# Patient Record
Sex: FEMALE | Race: WHITE | NOT HISPANIC OR LATINO | Employment: UNEMPLOYED | URBAN - METROPOLITAN AREA
[De-identification: names, ages, dates, MRNs, and addresses within clinical notes are randomized per-mention and may not be internally consistent; named-entity substitution may affect disease eponyms.]

---

## 2023-05-03 PROBLEM — N87.9 CERVICAL DYSPLASIA: Status: ACTIVE | Noted: 2023-05-03

## 2023-05-03 PROBLEM — M79.7 FIBROMYALGIA: Status: ACTIVE | Noted: 2023-05-03

## 2023-05-03 PROBLEM — E06.3 HASHIMOTO'S DISEASE: Status: ACTIVE | Noted: 2023-05-03

## 2023-05-03 PROBLEM — G89.29 CHRONIC BACK PAIN: Status: ACTIVE | Noted: 2023-05-03

## 2023-05-03 PROBLEM — M19.90 ARTHRITIS: Status: ACTIVE | Noted: 2023-05-03

## 2023-05-03 PROBLEM — J44.9 COPD (CHRONIC OBSTRUCTIVE PULMONARY DISEASE) (MULTI): Status: ACTIVE | Noted: 2023-05-03

## 2023-05-03 PROBLEM — L23.7 POISON IVY: Status: RESOLVED | Noted: 2023-05-03 | Resolved: 2023-05-03

## 2023-05-03 PROBLEM — A64 STD (FEMALE): Status: ACTIVE | Noted: 2023-05-03

## 2023-05-03 PROBLEM — K58.9 IBS (IRRITABLE BOWEL SYNDROME): Status: ACTIVE | Noted: 2023-05-03

## 2023-05-03 PROBLEM — M54.9 CHRONIC BACK PAIN: Status: ACTIVE | Noted: 2023-05-03

## 2023-05-03 PROBLEM — E03.9 HYPOTHYROIDISM: Status: ACTIVE | Noted: 2023-05-03

## 2023-05-03 PROBLEM — E78.2 MIXED HYPERLIPIDEMIA: Status: ACTIVE | Noted: 2023-05-03

## 2023-05-03 RX ORDER — ATORVASTATIN CALCIUM 20 MG/1
20 TABLET, FILM COATED ORAL DAILY
COMMUNITY
End: 2024-01-25 | Stop reason: SDUPTHER

## 2023-08-04 ENCOUNTER — OFFICE VISIT (OUTPATIENT)
Dept: PRIMARY CARE | Facility: CLINIC | Age: 58
End: 2023-08-04
Payer: MEDICAID

## 2023-08-04 VITALS
HEART RATE: 65 BPM | BODY MASS INDEX: 29.83 KG/M2 | RESPIRATION RATE: 16 BRPM | SYSTOLIC BLOOD PRESSURE: 134 MMHG | WEIGHT: 158 LBS | OXYGEN SATURATION: 97 % | HEIGHT: 61 IN | DIASTOLIC BLOOD PRESSURE: 82 MMHG

## 2023-08-04 DIAGNOSIS — M47.9 SPONDYLOSIS: ICD-10-CM

## 2023-08-04 DIAGNOSIS — R15.9 FULL INCONTINENCE OF FECES: Primary | ICD-10-CM

## 2023-08-04 DIAGNOSIS — M48.061 FORAMINAL STENOSIS OF LUMBAR REGION: ICD-10-CM

## 2023-08-04 DIAGNOSIS — Z12.11 ENCOUNTER FOR SCREENING FOR MALIGNANT NEOPLASM OF COLON: ICD-10-CM

## 2023-08-04 DIAGNOSIS — F25.0 SCHIZOAFFECTIVE DISORDER, BIPOLAR TYPE (MULTI): ICD-10-CM

## 2023-08-04 DIAGNOSIS — F15.11: ICD-10-CM

## 2023-08-04 DIAGNOSIS — R20.0 LEFT LEG NUMBNESS: ICD-10-CM

## 2023-08-04 PROBLEM — J44.9 COPD (CHRONIC OBSTRUCTIVE PULMONARY DISEASE) (MULTI): Status: RESOLVED | Noted: 2023-05-03 | Resolved: 2023-08-04

## 2023-08-04 PROCEDURE — 99214 OFFICE O/P EST MOD 30 MIN: CPT | Performed by: STUDENT IN AN ORGANIZED HEALTH CARE EDUCATION/TRAINING PROGRAM

## 2023-08-04 RX ORDER — SERTRALINE HYDROCHLORIDE 100 MG/1
TABLET, FILM COATED ORAL
COMMUNITY
Start: 2023-07-20 | End: 2023-10-26

## 2023-08-04 RX ORDER — SERTRALINE HYDROCHLORIDE 50 MG/1
TABLET, FILM COATED ORAL
COMMUNITY
Start: 2023-07-20 | End: 2023-10-26

## 2023-08-04 ASSESSMENT — ENCOUNTER SYMPTOMS
FEVER: 0
NERVOUS/ANXIOUS: 1
BACK PAIN: 1
WEAKNESS: 0
NUMBNESS: 1
SHORTNESS OF BREATH: 0
HALLUCINATIONS: 0
LOSS OF SENSATION IN FEET: 0
SEIZURES: 0
FACIAL ASYMMETRY: 0
FATIGUE: 0
ACTIVITY CHANGE: 1
ABDOMINAL PAIN: 0
DIZZINESS: 0
CONFUSION: 0
DEPRESSION: 0
ARTHRALGIAS: 1
LIGHT-HEADEDNESS: 0
OCCASIONAL FEELINGS OF UNSTEADINESS: 0
TREMORS: 0
HEADACHES: 0
ROS GI COMMENTS: FECAL INCONTINENCE
SPEECH DIFFICULTY: 0

## 2023-08-04 ASSESSMENT — PATIENT HEALTH QUESTIONNAIRE - PHQ9
1. LITTLE INTEREST OR PLEASURE IN DOING THINGS: NOT AT ALL
SUM OF ALL RESPONSES TO PHQ9 QUESTIONS 1 AND 2: 0
2. FEELING DOWN, DEPRESSED OR HOPELESS: NOT AT ALL

## 2023-08-04 ASSESSMENT — PAIN SCALES - GENERAL: PAINLEVEL: 6

## 2023-08-04 NOTE — PROGRESS NOTES
"Patient Name:  Mary Delgadillo  MRN:  [id number]  :  1965    Subjective   Patient ID: Mayr Delgadillo is a 57 y.o. female who presents for Leg Cramps (LEFT leg feels tingly LEFT great toe numb RIGHT lower leg cramps and hurts NECK hurts to look side to side ).    HPI   58 yo female presents with multiple concerns     1 week ago full incontinence of feces  Woke up from dead sleep with sheets soiled  Since having difficulty assessing when to go with almost having other accidents  Now also having new numbness in the L lower leg  Denies saddle anesthesia, radiation of symptom, hx of malignancy, hx of chronic steroid usage  Denies trauma to spine  Also concerns of R leg cramping intermittently     Review of Systems   Constitutional:  Positive for activity change. Negative for fatigue and fever.   Respiratory:  Negative for shortness of breath.    Cardiovascular:  Negative for chest pain.   Gastrointestinal:  Negative for abdominal pain.        Fecal incontinence   Musculoskeletal:  Positive for arthralgias and back pain. Negative for gait problem.   Allergic/Immunologic: Negative for immunocompromised state.   Neurological:  Positive for numbness. Negative for dizziness, tremors, seizures, syncope, facial asymmetry, speech difficulty, weakness, light-headedness and headaches.   Psychiatric/Behavioral:  Negative for confusion and hallucinations. The patient is nervous/anxious.      Objective   /82   Pulse 65   Resp 16   Ht 1.549 m (5' 1\")   Wt 71.7 kg (158 lb)   SpO2 97%   BMI 29.85 kg/m²     Physical Exam  Constitutional:       Appearance: Normal appearance.   HENT:      Head: Normocephalic and atraumatic.   Cardiovascular:      Rate and Rhythm: Normal rate.   Pulmonary:      Effort: Pulmonary effort is normal.   Neurological:      Mental Status: She is alert.      Comments: Patient endorses decreased sensation from L hip down to L toes  No weakness noted on exam, symmetric strength and ROM  Negative " straight leg    Psychiatric:         Mood and Affect: Mood normal.         Speech: Speech is tangential.         Behavior: Behavior normal.     Assessment/Plan   1. Full incontinence of feces  MR lumbar spine w and wo IV contrast    XR lumbar spine 2-3 views    Follow Up In [facility] - PCP - Established    CANCELED: MR lumbar spine w and wo IV contrast      2. Left leg numbness  MR lumbar spine w and wo IV contrast    XR lumbar spine 2-3 views    EMG & nerve conduction    Follow Up In [facility] - PCP - Established    CANCELED: MR lumbar spine w and wo IV contrast      3. Encounter for screening for malignant neoplasm of colon  Colonoscopy      4. Schizoaffective disorder, bipolar type (CMS/HCC)        5. History of methamphetamine abuse (CMS/HCC)          Discussed with patient combination of new fecal incontinence/back pain/L leg sensation deficit I would recommend ED to rule out cauda equina or other acute emergency- patient declines she states she understands the risks  Working on obtaining stat MRI, insurance providing some push back trying to work around  Able to get xray today-ordered  EMG will schedule  Colonoscopy down the line also needed    With her mental health- stopped invega, and is no longer seeing a counselor, reports her psychiatrist is leaving [place] as well unsure who will take place  Recommending close follow up and continuing zoloft     Continues to feel strong in her sobriety

## 2023-08-04 NOTE — PATIENT INSTRUCTIONS
Will work on MRI trying to get stat  Xray today  If symptoms worsen- Need to report to the ED   EMG ordered for nerve symptoms

## 2023-08-07 ENCOUNTER — TELEPHONE (OUTPATIENT)
Dept: PRIMARY CARE | Facility: CLINIC | Age: 58
End: 2023-08-07
Payer: MEDICAID

## 2023-08-07 NOTE — TELEPHONE ENCOUNTER
----- Message from Verónica Bradshaw DO sent at 8/7/2023  6:59 AM EDT -----  Patient has multilevel spondylosis with narrowing, most pronounced at the lower spine L4/L5. Placing order for spinal evaluation-referral. As discussed with her before if symptoms persist, ED.

## 2023-08-07 NOTE — TELEPHONE ENCOUNTER
I think she is looking for a name of the MD you would like her to see since Dr Kay is unavailable.Please advise

## 2023-08-07 NOTE — TELEPHONE ENCOUNTER
Mary called back / given results    Who did you want her to see ?  (She is okay with going outside of [facility].)

## 2023-09-05 DIAGNOSIS — R20.0 LEFT LEG NUMBNESS: ICD-10-CM

## 2023-09-06 ENCOUNTER — TELEPHONE (OUTPATIENT)
Dept: PRIMARY CARE | Facility: CLINIC | Age: 58
End: 2023-09-06
Payer: MEDICAID

## 2023-09-21 LAB — C REACTIVE PROTEIN (MG/L) IN SER/PLAS: <0.1 MG/DL

## 2023-09-22 LAB
DEAMIDATED GLIADIN PEPTIDE IGA: <1 U/ML (ref 0–14)
DEAMIDATED GLIADIN PEPTIDE IGG: <1 U/ML (ref 0–14)
HEPATITIS C VIRUS AB PRESENCE IN SERUM: NONREACTIVE
TISSUE TRANSGLUTAMINASE IGG: <1 U/ML (ref 0–14)
TISSUE TRANSGLUTAMINASE, IGA: <1 U/ML (ref 0–14)

## 2023-10-02 ENCOUNTER — APPOINTMENT (OUTPATIENT)
Dept: PHYSICAL THERAPY | Facility: HOSPITAL | Age: 58
End: 2023-10-02
Payer: MEDICAID

## 2023-10-05 ENCOUNTER — APPOINTMENT (OUTPATIENT)
Dept: PHYSICAL THERAPY | Facility: HOSPITAL | Age: 58
End: 2023-10-05
Payer: MEDICAID

## 2023-10-05 ENCOUNTER — DOCUMENTATION (OUTPATIENT)
Dept: PHYSICAL THERAPY | Facility: HOSPITAL | Age: 58
End: 2023-10-05
Payer: MEDICAID

## 2023-10-05 NOTE — PROGRESS NOTES
Physical Therapy                 Therapy Communication Note    Patient Name: Mary Delgadillo  MRN: [id number]  Today's Date: 10/5/2023     Discipline: Physical Therapy    Missed Visit Reason:  no reason    Missed Time: Cancel    Comment:

## 2023-10-09 ENCOUNTER — TREATMENT (OUTPATIENT)
Dept: PHYSICAL THERAPY | Facility: HOSPITAL | Age: 58
End: 2023-10-09
Payer: MEDICAID

## 2023-10-09 DIAGNOSIS — R29.898 WEAKNESS OF BACK: ICD-10-CM

## 2023-10-09 DIAGNOSIS — M54.50 LOW BACK PAIN, UNSPECIFIED: Primary | ICD-10-CM

## 2023-10-09 DIAGNOSIS — M54.9 DORSALGIA, UNSPECIFIED: ICD-10-CM

## 2023-10-09 PROCEDURE — 97113 AQUATIC THERAPY/EXERCISES: CPT | Mod: GP,CQ

## 2023-10-09 PROCEDURE — 97110 THERAPEUTIC EXERCISES: CPT | Mod: 59,GP | Performed by: PHYSICAL THERAPIST

## 2023-10-09 ASSESSMENT — PAIN DESCRIPTION - DESCRIPTORS: DESCRIPTORS: ACHING

## 2023-10-09 ASSESSMENT — PAIN - FUNCTIONAL ASSESSMENT
PAIN_FUNCTIONAL_ASSESSMENT: 0-10
PAIN_FUNCTIONAL_ASSESSMENT: 0-10

## 2023-10-09 ASSESSMENT — PAIN SCALES - GENERAL
PAINLEVEL_OUTOF10: 7
PAINLEVEL_OUTOF10: 7

## 2023-10-09 ASSESSMENT — ENCOUNTER SYMPTOMS
LOSS OF SENSATION IN FEET: 1
DEPRESSION: 0
OCCASIONAL FEELINGS OF UNSTEADINESS: 0

## 2023-10-09 NOTE — PROGRESS NOTES
Physical Therapy    Physical Therapy Treatment    Patient Name: Mary Delgadillo  MRN: [id number]  Today's Date: 10/9/2023  Visit 5         Assessment:       Plan: Continue PT        Current Problem  1. Low back pain, unspecified        2. Dorsalgia, unspecified  Follow Up In Physical Therapy      3. Weakness of back            Subjective 7/10 LBP and Rt LE lower leg  Pulling out a tree root at home she fell back, no injury reported. 1 fall , not due to loss of balance  Next week travels to [place]  Visit 5     Precautions  Lumbar spondylolisthesis  No prior ortho surgery to spine  Smokes, hx of pneumonia , resources provided         Pain  Pain Assessment: 0-10  Pain Score: 7  Pain Type: Chronic pain  Pain Location: Back    Objective        Posture , increased lumbar lordosis, hx spondylolisthesis     Extremity/Trunk Assessment    Lumbopelvic:   Abdominal 3+/5.   Hip:   Extension: 3/5 on right and 3/5 on left.   Flexion: 5/5 on right and 5/5 on left.   Abduction: 4+/5 on right and 4+/5 on left.     Trunk ROM  Forwards reach to mid shin  Pain with side bend left , ROM WFL    Cues to engage abdominal with activities and exercises        Outcome Measures:        Treatments:  Nustep 11 min L1   Pelvic tilt 10x 2  Standing with back to wall pelvic tilt 10 x 2  Rows red 10 x 2  Lat pulls red 10 x2   Shuttle DLP 5B 10 x 2  Shuttle SLP 3 B 10 x 2  Prone lying ( at home 1-2 min)   Hamstrings WNL 90 each  Education re core abdominals        OP EDUCATION: core abdominal engagement       Goals:  Encounter Problems       Encounter Problems (Active)       PT Problem       PT Goal 1       Start:  10/09/23    Expected End:  11/20/23       Pt will demonstrate independence and compliance with HEP and self management         PT Goal 2       Start:  10/09/23    Expected End:  11/20/23       Pt to improve sleep 50% with positioning and with therapy with less pain           PT Goal 3       Start:  10/09/23    Expected End:  11/20/23        Pt will demonstrate >=50% reduction of pain           PT Goal 4       Start:  10/09/23    Expected End:  11/20/23       Pt will improve strength by >=1/3 MMT to improve ease with functional mobility  Oswestry to improve 5 points or more to increase in function

## 2023-10-09 NOTE — PROGRESS NOTES
"Physical Therapy    Physical Therapy Treatment    Patient Name: Mary Delgadillo  MRN: [id number]  Today's Date: 10/9/2023  Time Calculation  Start Time: 1350  Stop Time: 1435  Time Calculation (min): 45 minVisit #5      Assessment:  PT Assessment  Evaluation/Treatment Tolerance: Patient tolerated treatment well  Assessment Comment: pain decreased to 5/10 in LB after treatment, good ppostural awareness with all exs    Plan:  OP PT Plan  PT Plan:  (progress reps and add wand exs next visit if johanny)    Current Problem  1. Low back pain, unspecified        2. Dorsalgia, unspecified  Follow Up In Physical Therapy      3. Weakness of back            Subjective  pt reports has been doing a lot of work around house and outdoors           Pain  Pain Assessment: 0-10  Pain Score: 7  Pain Type: Chronic pain  Pain Location: Back  Pain Orientation: Lower  Pain Descriptors: Aching  Pain Frequency: Intermittent    Objective  added Hip EXT and deep water Biking        Treatments:   Aquatic Exercise  Aquatic Exercise Performed: Yes  Aquatic Exercise Activity 1: Amb Forw/Retro/Lat X 2 laps EA  Aquatic Exercise Activity 2: Marching X 2 laps  Aquatic Exercise Activity 3: TR/HR X 15 EA  Aquatic Exercise Activity 4: Squats X 15  Aquatic Exercise Activity 5: SLR X 3 - 15 EA  Aquatic Exercise Activity 6: Step ups X !5 EA  Aquatic Exercise Activity 7: HS and Gastroc stretches- 3X 15\"  Aquatic Exercise Activity 8: Deep water Biking and distraction 5' and 5'          Goals:       PT Goal 1         Start:  10/09/23    Expected End:  11/20/23        Pt will demonstrate independence and compliance with HEP and self management           PT Goal 2         Start:  10/09/23    Expected End:  11/20/23        Pt to improve sleep 50% with positioning and with therapy with less pain              PT Goal 3         Start:  10/09/23    Expected End:  11/20/23        Pt will demonstrate >=50% reduction of pain              PT Goal 4         Start:  10/09/23   "  Expected End:  11/20/23        Pt will improve strength by >=1/3 MMT to improve ease with functional mobility  Oswestry to improve 5 points or more to increase in function

## 2023-10-12 ENCOUNTER — TREATMENT (OUTPATIENT)
Dept: PHYSICAL THERAPY | Facility: HOSPITAL | Age: 58
End: 2023-10-12
Payer: MEDICAID

## 2023-10-12 DIAGNOSIS — M54.9 DORSALGIA, UNSPECIFIED: ICD-10-CM

## 2023-10-12 DIAGNOSIS — M54.50 LOW BACK PAIN, UNSPECIFIED: Primary | ICD-10-CM

## 2023-10-12 DIAGNOSIS — R29.898 WEAKNESS OF BACK: ICD-10-CM

## 2023-10-12 DIAGNOSIS — R29.898 WEAKNESS OF BACK: Primary | ICD-10-CM

## 2023-10-12 PROCEDURE — 97110 THERAPEUTIC EXERCISES: CPT | Mod: 59,GP | Performed by: PHYSICAL THERAPIST

## 2023-10-12 PROCEDURE — 97113 AQUATIC THERAPY/EXERCISES: CPT | Mod: GP,CQ

## 2023-10-12 ASSESSMENT — PAIN SCALES - GENERAL
PAINLEVEL_OUTOF10: 8
PAINLEVEL_OUTOF10: 8

## 2023-10-12 ASSESSMENT — PAIN DESCRIPTION - DESCRIPTORS
DESCRIPTORS: ACHING
DESCRIPTORS: ACHING

## 2023-10-12 ASSESSMENT — PAIN - FUNCTIONAL ASSESSMENT
PAIN_FUNCTIONAL_ASSESSMENT: 0-10
PAIN_FUNCTIONAL_ASSESSMENT: 0-10

## 2023-10-12 NOTE — PROGRESS NOTES
"Physical Therapy    Physical Therapy Treatment    Patient Name: Mary Delgadillo  MRN: [id number]  Today's Date: 10/12/2023  Time Calculation  Start Time: 1350  Stop Time: 1435  Time Calculation (min): 45 min  VISIT 6      Assessment:  PT Assessment  Evaluation/Treatment Tolerance: Patient tolerated treatment well  Assessment Comment: pain decreased 5/10 after deep water distraction, good johanny of new exs and reps, improved posture with all exs    Plan:  OP PT Plan  PT Plan:  (add BDB to amb if able next visit)    Current Problem  1. Weakness of back        2. Dorsalgia, unspecified  Follow Up In Physical Therapy          Subjective  pain 8/10 after land therapy today           Pain  Pain Assessment: 0-10  Pain Score: 8  Pain Type: Chronic pain  Pain Location: Back  Pain Orientation: Lower  Pain Descriptors: Aching  Pain Frequency: Intermittent    Objective added tray exs and increased reps added Wand exs X3 with BDB        Treatments:     Aquatic Exercise  Aquatic Exercise Performed: Yes  Aquatic Exercise Activity 1: Amb Forw/Retro/Lat X 2 laps EA  Aquatic Exercise Activity 2: Marching X 2 laps  Aquatic Exercise Activity 3: TR/HR X 15 EA  Aquatic Exercise Activity 4: Squats X 15  Aquatic Exercise Activity 5: SLR X 3 - 15 EA  Aquatic Exercise Activity 6: Step ups X !5 EA  Aquatic Exercise Activity 7: HS and Gastroc stretches- 3X 15\"  Aquatic Exercise Activity 8: Deep water Biking and distraction 5' and 5'  Aquatic Exercise Activity 9: tray Exs -PUSH/PULL, UP/DOWN-X 20 EA  T!  Aquatic Exercise Activity 10: Wand Exs X 3-Shoulder FLEX/EXT, ABD/ADD and Horizontal ABD/ADD X 15 EA- BDB      Goals:  Active       PT Problem       PT Goal 1 (Met)       Start:  10/09/23    Expected End:  11/20/23    Resolved:  10/12/23    Pt will demonstrate independence and compliance with HEP and self management         PT Goal 2 (Progressing)       Start:  10/09/23    Expected End:  11/20/23       Pt to improve sleep 50% with positioning and " with therapy with less pain           PT Goal 3 (Not Progressing)       Start:  10/09/23    Expected End:  11/20/23       Pt will demonstrate >=50% reduction of pain           PT Goal 4 (Progressing)       Start:  10/09/23    Expected End:  11/20/23       Pt will improve strength by >=1/3 MMT to improve ease with functional mobility  Oswestry to improve 5 points or more to increase in function

## 2023-10-12 NOTE — PROGRESS NOTES
Physical Therapy    Physical Therapy Treatment    Patient Name: Mary Delgadillo  MRN: [id number]  Today's Date: 10/12/2023  Visit 6  Time Calculation  Start Time: 0100  Stop Time: 0140  Time Calculation (min): 40 min      Assessment:        Plan:         Current Problem  1. Low back pain, unspecified        2. Dorsalgia, unspecified  Follow Up In Physical Therapy      3. Weakness of back            Subjective 8/10 LBP and Rt LE lower leg  Last week when Pulling out a tree root at home she fell back, no injury reported. 1 fall , not due to loss of balance. May have increased her pain with outdoor work.   Next week travels to [place]  Visit 6     Precautions  Lumbar spondylolisthesis  No prior ortho surgery to spine  Smokes, hx of pneumonia , resources provided         Pain  Pain Assessment: 0-10  Pain Score: 8  Pain Location: Back  Pain Orientation: Lower, Left  Pain Descriptors: Aching    Objective        Posture , increased lumbar lordosis, hx spondylolisthesis     Extremity/Trunk Assessment    Lumbopelvic:   Abdominal 4-/5.   Hip:   Extension: 4/5 on right and 4/5 on left.   Flexion: 5/5 on right and 5/5 on left.   Abduction: 4/5 on right and 4/5 on left.     Trunk ROM  Forwards reach to mid shin  Pain with side bend left , ROM WFL    Cues to engage abdominal with activities and exercises      Treatments:  Nustep 7 min L2 ( stopped short of 10 min due to back discomfort )  Pelvic tilt 10x 2  Standing with back to wall pelvic tilt 10 x 2  Rows red 10 x 2  Lat pulls red 10 x2   Shuttle DLP 5B 10 x 2  Shuttle SLP 3 B 10 x 2  Prone lying ( at home 1-2 min)  Single knee to chest 10 sec x 3 each  Clam shell 10 x 2 each  Lower trunk rotate 10 sec x 3   Hamstrings WNL 90 each  Education re core abdominals        OP EDUCATION: core abdominal engagement. Advise to pace self with outdoor projects to reduce the lifting and twisting and bending of yard work       Goals:  Active       PT Problem       PT Goal 1 (Met)        Start:  10/09/23    Expected End:  11/20/23    Resolved:  10/12/23    Pt will demonstrate independence and compliance with HEP and self management         PT Goal 2 (Progressing)       Start:  10/09/23    Expected End:  11/20/23       Pt to improve sleep 50% with positioning and with therapy with less pain           PT Goal 3 (Not Progressing)       Start:  10/09/23    Expected End:  11/20/23       Pt will demonstrate >=50% reduction of pain           PT Goal 4       Start:  10/09/23    Expected End:  11/20/23       Pt will improve strength by >=1/3 MMT to improve ease with functional mobility  Oswestry to improve 5 points or more to increase in function

## 2023-10-26 ENCOUNTER — OFFICE VISIT (OUTPATIENT)
Dept: PRIMARY CARE | Facility: CLINIC | Age: 58
End: 2023-10-26
Payer: MEDICAID

## 2023-10-26 VITALS
SYSTOLIC BLOOD PRESSURE: 132 MMHG | HEIGHT: 62 IN | DIASTOLIC BLOOD PRESSURE: 68 MMHG | BODY MASS INDEX: 29.26 KG/M2 | WEIGHT: 159 LBS | HEART RATE: 67 BPM

## 2023-10-26 DIAGNOSIS — Z12.31 ENCOUNTER FOR SCREENING MAMMOGRAM FOR BREAST CANCER: ICD-10-CM

## 2023-10-26 DIAGNOSIS — M79.7 FIBROMYALGIA: Primary | ICD-10-CM

## 2023-10-26 DIAGNOSIS — E78.2 MIXED HYPERLIPIDEMIA: ICD-10-CM

## 2023-10-26 DIAGNOSIS — Z12.11 ENCOUNTER FOR SCREENING COLONOSCOPY FOR NON-HIGH-RISK PATIENT: ICD-10-CM

## 2023-10-26 DIAGNOSIS — F17.200 CURRENT SMOKER: ICD-10-CM

## 2023-10-26 DIAGNOSIS — E06.3 HASHIMOTO'S DISEASE: ICD-10-CM

## 2023-10-26 PROBLEM — F25.0 SCHIZOAFFECTIVE DISORDER, BIPOLAR TYPE (MULTI): Chronic | Status: RESOLVED | Noted: 2023-08-04 | Resolved: 2023-10-26

## 2023-10-26 PROCEDURE — 99406 BEHAV CHNG SMOKING 3-10 MIN: CPT | Performed by: STUDENT IN AN ORGANIZED HEALTH CARE EDUCATION/TRAINING PROGRAM

## 2023-10-26 PROCEDURE — 99214 OFFICE O/P EST MOD 30 MIN: CPT | Performed by: STUDENT IN AN ORGANIZED HEALTH CARE EDUCATION/TRAINING PROGRAM

## 2023-10-26 RX ORDER — SERTRALINE HYDROCHLORIDE 50 MG/1
TABLET, FILM COATED ORAL
Qty: 90 TABLET | Refills: 1 | Status: SHIPPED | OUTPATIENT
Start: 2023-10-26

## 2023-10-26 RX ORDER — SERTRALINE HYDROCHLORIDE 100 MG/1
TABLET, FILM COATED ORAL
Qty: 90 TABLET | Refills: 1 | Status: SHIPPED | OUTPATIENT
Start: 2023-10-26 | End: 2024-03-18 | Stop reason: SDUPTHER

## 2023-10-26 RX ORDER — POLYETHYLENE GLYCOL-3350 AND ELECTROLYTES 236; 6.74; 5.86; 2.97; 22.74 G/274.31G; G/274.31G; G/274.31G; G/274.31G; G/274.31G
POWDER, FOR SOLUTION ORAL
COMMUNITY
Start: 2023-09-21 | End: 2023-12-20 | Stop reason: ALTCHOICE

## 2023-10-26 ASSESSMENT — ENCOUNTER SYMPTOMS
OCCASIONAL FEELINGS OF UNSTEADINESS: 0
NERVOUS/ANXIOUS: 0
FACIAL ASYMMETRY: 0
DEPRESSION: 0
DYSPHORIC MOOD: 0
APPETITE CHANGE: 0
SHORTNESS OF BREATH: 0
ARTHRALGIAS: 1
LOSS OF SENSATION IN FEET: 0
HEADACHES: 0
DECREASED CONCENTRATION: 0
FEVER: 0
CONFUSION: 0
PALPITATIONS: 0
COUGH: 0

## 2023-10-26 ASSESSMENT — COLUMBIA-SUICIDE SEVERITY RATING SCALE - C-SSRS
2. HAVE YOU ACTUALLY HAD ANY THOUGHTS OF KILLING YOURSELF?: NO
1. IN THE PAST MONTH, HAVE YOU WISHED YOU WERE DEAD OR WISHED YOU COULD GO TO SLEEP AND NOT WAKE UP?: NO
6. HAVE YOU EVER DONE ANYTHING, STARTED TO DO ANYTHING, OR PREPARED TO DO ANYTHING TO END YOUR LIFE?: NO

## 2023-10-26 NOTE — PROGRESS NOTES
"Patient Name:  Mary Delgadillo  MRN:  [id number]  :  1965    Subjective   Patient ID: Mary Delgadillo is a 58 y.o. female who presents for Follow-up (Here to discuss Zoloft, was seeing Sal.  Said she was on Zoloft for fibromyalgia for last 30 years, not mental health.).    HPI     57 yo female presents to discuss zoloft     [facility] was giving her zoloft for fibromyalgia then psychiatry took over, her psychiatrist has left and she wants to continue med  Zoloft is really helpful     Diagnosis of bipolar was while in [place] she does not have any josé miguel or depression symptoms and has not since a \"breakdown\" at the beginning of that event due to a lot of outside stressors    Has successfully quit for 11 years in the past     Review of Systems   Constitutional:  Negative for appetite change and fever.   Respiratory:  Negative for cough and shortness of breath.    Cardiovascular:  Negative for chest pain, palpitations and leg swelling.   Musculoskeletal:  Positive for arthralgias.   Allergic/Immunologic: Negative for immunocompromised state.   Neurological:  Negative for facial asymmetry and headaches.   Psychiatric/Behavioral:  Negative for behavioral problems, confusion, decreased concentration, dysphoric mood and suicidal ideas. The patient is not nervous/anxious.      Objective   /68 (BP Location: Right arm, Patient Position: Sitting)   Pulse 67   Ht 1.562 m (5' 1.5\")   Wt 72.1 kg (159 lb)   BMI 29.56 kg/m²     Physical Exam  Constitutional:       Appearance: Normal appearance.   HENT:      Head: Normocephalic and atraumatic.   Pulmonary:      Effort: Pulmonary effort is normal.   Skin:     Coloration: Skin is not jaundiced or pale.   Neurological:      Mental Status: She is alert and oriented to person, place, and time.   Psychiatric:         Mood and Affect: Mood normal.         Behavior: Behavior normal.         Thought Content: Thought content normal.         Judgment: Judgment " normal.     Assessment/Plan   Problem List Items Addressed This Visit             ICD-10-CM    Fibromyalgia - Primary (Chronic) M79.7     Zoloft controls symptoms, continue at present dose 150mg          Relevant Medications    sertraline (Zoloft) 50 mg tablet    sertraline (Zoloft) 100 mg tablet    Other Relevant Orders    Follow Up In [facility] - PCP - Established    Hashimoto's disease (Chronic) E06.3     Currently not requiring medication  Annual labs          Relevant Orders    Tsh With Reflex To Free T4 If Abnormal    Mixed hyperlipidemia E78.2    Relevant Orders    Lipid Panel    Comprehensive metabolic panel    Current smoker F17.200     Counseled on long term negative health impacts on health if tobacco use continued.  Reviewed options for cessation aid including patches, lozenges, Wellbutrin, Chantix.  Discussed motivational factors to improve chances for success.  Time Spent: 4  Orders: none, will call once she looks into the patches if she would like script             Other Visit Diagnoses         Codes    Encounter for screening colonoscopy for non-high-risk patient     Z12.11    Relevant Medications    GaviLyte-G 236-22.74-6.74 -5.86 gram solution    Encounter for screening mammogram for breast cancer     Z12.31    Relevant Orders    BI mammo bilateral screening tomosynthesis

## 2023-10-26 NOTE — ASSESSMENT & PLAN NOTE
Counseled on long term negative health impacts on health if tobacco use continued.  Reviewed options for cessation aid including patches, lozenges, Wellbutrin, Chantix.  Discussed motivational factors to improve chances for success.  Time Spent: 4  Orders: none, will call once she looks into the patches if she would like script

## 2023-10-31 ENCOUNTER — ANESTHESIA EVENT (OUTPATIENT)
Dept: GASTROENTEROLOGY | Facility: HOSPITAL | Age: 58
End: 2023-10-31
Payer: MEDICAID

## 2023-10-31 ENCOUNTER — ANESTHESIA (OUTPATIENT)
Dept: GASTROENTEROLOGY | Facility: HOSPITAL | Age: 58
End: 2023-10-31
Payer: MEDICAID

## 2023-10-31 ENCOUNTER — HOSPITAL ENCOUNTER (OUTPATIENT)
Dept: GASTROENTEROLOGY | Facility: HOSPITAL | Age: 58
Setting detail: OUTPATIENT SURGERY
Discharge: HOME | End: 2023-10-31
Payer: MEDICAID

## 2023-10-31 VITALS
OXYGEN SATURATION: 98 % | HEIGHT: 61 IN | BODY MASS INDEX: 29.45 KG/M2 | HEART RATE: 57 BPM | SYSTOLIC BLOOD PRESSURE: 127 MMHG | WEIGHT: 156 LBS | TEMPERATURE: 97.5 F | DIASTOLIC BLOOD PRESSURE: 66 MMHG | RESPIRATION RATE: 16 BRPM

## 2023-10-31 DIAGNOSIS — K52.9 NONINFECTIVE GASTROENTERITIS AND COLITIS, UNSPECIFIED: ICD-10-CM

## 2023-10-31 PROBLEM — F20.9 SCHIZOPHRENIA (MULTI): Status: ACTIVE | Noted: 2023-10-31

## 2023-10-31 PROCEDURE — 7100000009 HC PHASE TWO TIME - INITIAL BASE CHARGE: Performed by: INTERNAL MEDICINE

## 2023-10-31 PROCEDURE — 3700000002 HC GENERAL ANESTHESIA TIME - EACH INCREMENTAL 1 MINUTE: Performed by: INTERNAL MEDICINE

## 2023-10-31 PROCEDURE — 3700000001 HC GENERAL ANESTHESIA TIME - INITIAL BASE CHARGE: Performed by: INTERNAL MEDICINE

## 2023-10-31 PROCEDURE — 45380 COLONOSCOPY AND BIOPSY: CPT | Mod: XS | Performed by: INTERNAL MEDICINE

## 2023-10-31 PROCEDURE — 88305 TISSUE EXAM BY PATHOLOGIST: CPT | Mod: TC,PORLAB,59 | Performed by: INTERNAL MEDICINE

## 2023-10-31 PROCEDURE — 2500000004 HC RX 250 GENERAL PHARMACY W/ HCPCS (ALT 636 FOR OP/ED): Performed by: INTERNAL MEDICINE

## 2023-10-31 PROCEDURE — 88305 TISSUE EXAM BY PATHOLOGIST: CPT | Performed by: STUDENT IN AN ORGANIZED HEALTH CARE EDUCATION/TRAINING PROGRAM

## 2023-10-31 PROCEDURE — 2500000005 HC RX 250 GENERAL PHARMACY W/O HCPCS: Performed by: NURSE ANESTHETIST, CERTIFIED REGISTERED

## 2023-10-31 PROCEDURE — 2500000004 HC RX 250 GENERAL PHARMACY W/ HCPCS (ALT 636 FOR OP/ED): Performed by: NURSE ANESTHETIST, CERTIFIED REGISTERED

## 2023-10-31 PROCEDURE — 88305 TISSUE EXAM BY PATHOLOGIST: CPT | Mod: TC,SUR,PORLAB | Performed by: INTERNAL MEDICINE

## 2023-10-31 PROCEDURE — 2720000007 HC OR 272 NO HCPCS: Performed by: INTERNAL MEDICINE

## 2023-10-31 PROCEDURE — A4649 SURGICAL SUPPLIES: HCPCS | Performed by: INTERNAL MEDICINE

## 2023-10-31 PROCEDURE — 45385 COLONOSCOPY W/LESION REMOVAL: CPT | Performed by: INTERNAL MEDICINE

## 2023-10-31 PROCEDURE — 7100000010 HC PHASE TWO TIME - EACH INCREMENTAL 1 MINUTE: Performed by: INTERNAL MEDICINE

## 2023-10-31 RX ORDER — SODIUM CHLORIDE 9 MG/ML
30 INJECTION, SOLUTION INTRAVENOUS CONTINUOUS
Status: DISCONTINUED | OUTPATIENT
Start: 2023-10-31 | End: 2023-11-01 | Stop reason: HOSPADM

## 2023-10-31 RX ORDER — LIDOCAINE HYDROCHLORIDE 20 MG/ML
INJECTION, SOLUTION EPIDURAL; INFILTRATION; INTRACAUDAL; PERINEURAL AS NEEDED
Status: DISCONTINUED | OUTPATIENT
Start: 2023-10-31 | End: 2023-10-31

## 2023-10-31 RX ORDER — PROPOFOL 10 MG/ML
INJECTION, EMULSION INTRAVENOUS AS NEEDED
Status: DISCONTINUED | OUTPATIENT
Start: 2023-10-31 | End: 2023-10-31

## 2023-10-31 RX ADMIN — PROPOFOL 50 MG: 10 INJECTION, EMULSION INTRAVENOUS at 11:26

## 2023-10-31 RX ADMIN — PROPOFOL 50 MG: 10 INJECTION, EMULSION INTRAVENOUS at 11:18

## 2023-10-31 RX ADMIN — PROPOFOL 30 MG: 10 INJECTION, EMULSION INTRAVENOUS at 11:10

## 2023-10-31 RX ADMIN — SODIUM CHLORIDE: 9 INJECTION, SOLUTION INTRAVENOUS at 11:01

## 2023-10-31 RX ADMIN — PROPOFOL 20 MG: 10 INJECTION, EMULSION INTRAVENOUS at 11:12

## 2023-10-31 RX ADMIN — PROPOFOL 80 MG: 10 INJECTION, EMULSION INTRAVENOUS at 11:04

## 2023-10-31 RX ADMIN — LIDOCAINE HYDROCHLORIDE 40 MG: 20 INJECTION, SOLUTION EPIDURAL; INFILTRATION; INTRACAUDAL; PERINEURAL at 11:04

## 2023-10-31 RX ADMIN — PROPOFOL 50 MG: 10 INJECTION, EMULSION INTRAVENOUS at 11:15

## 2023-10-31 RX ADMIN — PROPOFOL 50 MG: 10 INJECTION, EMULSION INTRAVENOUS at 11:21

## 2023-10-31 RX ADMIN — PROPOFOL 50 MG: 10 INJECTION, EMULSION INTRAVENOUS at 11:07

## 2023-10-31 RX ADMIN — PROPOFOL 20 MG: 10 INJECTION, EMULSION INTRAVENOUS at 11:28

## 2023-10-31 SDOH — HEALTH STABILITY: MENTAL HEALTH: CURRENT SMOKER: 1

## 2023-10-31 ASSESSMENT — PAIN SCALES - GENERAL
PAINLEVEL_OUTOF10: 4
PAIN_LEVEL: 0
PAINLEVEL_OUTOF10: 0 - NO PAIN
PAINLEVEL_OUTOF10: 4

## 2023-10-31 ASSESSMENT — PAIN - FUNCTIONAL ASSESSMENT
PAIN_FUNCTIONAL_ASSESSMENT: 0-10

## 2023-10-31 ASSESSMENT — COLUMBIA-SUICIDE SEVERITY RATING SCALE - C-SSRS
1. IN THE PAST MONTH, HAVE YOU WISHED YOU WERE DEAD OR WISHED YOU COULD GO TO SLEEP AND NOT WAKE UP?: NO
2. HAVE YOU ACTUALLY HAD ANY THOUGHTS OF KILLING YOURSELF?: NO
6. HAVE YOU EVER DONE ANYTHING, STARTED TO DO ANYTHING, OR PREPARED TO DO ANYTHING TO END YOUR LIFE?: NO

## 2023-10-31 NOTE — ANESTHESIA PREPROCEDURE EVALUATION
Patient: Mary Delgadillo    Procedure Information       Date/Time: 10/31/23 1030    Scheduled providers: Ang Del Castillo MD    Procedure: COLONOSCOPY    Location: [facility]            Relevant Problems   Anesthesia (within normal limits)      Cardiovascular   (+) Mixed hyperlipidemia      Endocrine   (+) Hypothyroidism      GI   (+) IBS (irritable bowel syndrome)      /Renal (within normal limits)      Neuro/Psych   (+) Schizophrenia (CMS/HCC)      GI/Hepatic (within normal limits)      Hematology (within normal limits)      Musculoskeletal   (+) Fibromyalgia      Infectious Disease   (+) STD (female)      Other   (+) Arthritis       Clinical information reviewed:   Tobacco  Allergies  Meds   Med Hx  Surg Hx   Fam Hx  Soc Hx        NPO Detail:  NPO/Void Status  Date of Last Liquid: 10/30/23  Time of Last Liquid: 2145  Date of Last Solid: 10/29/23  Time of Last Solid: 1900  Last Intake Type: Clear fluids         Physical Exam    Airway  Mallampati: II  TM distance: >3 FB  Neck ROM: full     Cardiovascular - normal exam  Rhythm: regular  Rate: normal     Dental - normal exam     Pulmonary - normal exam     Abdominal - normal exam             Anesthesia Plan    ASA 2     MAC     The patient is a current smoker.  Patient was previously instructed to abstain from smoking on day of procedure.  Patient smoked on day of procedure.    intravenous induction   Anesthetic plan and risks discussed with patient.

## 2023-10-31 NOTE — ANESTHESIA POSTPROCEDURE EVALUATION
Patient: Mary Delgadillo    Procedure Summary       Date: 10/31/23 Room / Location: [facility]    Anesthesia Start: 1100 Anesthesia Stop: 1138    Procedure: COLONOSCOPY Diagnosis: Noninfective gastroenteritis and colitis, unspecified    Scheduled Providers: Ang Del Castillo MD Responsible Provider: SHANA Pittman    Anesthesia Type: MAC ASA Status: 2            Anesthesia Type: MAC    Vitals Value Taken Time   BP 94/66 10/31/23 1138   Temp 36.4 °C (97.5 °F) 10/31/23 1138   Pulse 54 10/31/23 1138   Resp 16 10/31/23 1138   SpO2 97 % 10/31/23 1138       Anesthesia Post Evaluation    Patient location during evaluation: bedside  Level of consciousness: awake and alert  Pain score: 0  Pain management: adequate  Airway patency: patent  Cardiovascular status: acceptable  Respiratory status: acceptable  Hydration status: acceptable        There were no known notable events for this encounter.

## 2023-10-31 NOTE — H&P
History Of Present Illness  Mary Delgadillo is a 58 y.o. female presenting for evaluation of chronic diarrhea and associated with fever or hematochezia.  .   .     Past Medical History  She has a past medical history of COPD (chronic obstructive pulmonary disease) (CMS/[facility]), Poison ivy (05/03/2023), and Schizoaffective disorder, bipolar type (CMS/[facility]) (08/04/2023).    Surgical History  She has a past surgical history that includes Other surgical history (11/21/2022) and Bladder surgery.     Social History  She reports that she has been smoking cigarettes. She has been smoking an average of .5 packs per day. She has never used smokeless tobacco. She reports that she does not currently use drugs. She reports that she does not drink alcohol.    Family History  Family History   Problem Relation Name Age of Onset    Dementia Mother      Prostate cancer Father          Allergies  Azithromycin    Review of Systems  Constitutional: no fever, no chills, fatigue,  not feeling tired and no recent weight loss. No reports of dizziness.  SKIN: No skin rash or lesions  EYE: No pain photophobia, diplopia or blurred vision  EAR: No discharge, pain or hearing loss  NOSE: No congestion, epistaxis or obstruction  RESPIRATORY: No cough , dyspnea or  chest pain   CV: No chest pain, lower extremity swelling or palpitations  GE: No abdominal pain, nausea, vomiting, dysphagia or odynophagia. Denied constipation , diarrhea  : No dysuria or hematuria, urinary incontinence or urine frequency  NEURO: Denied weakness, confusion, dizziness, or numbness   PSYCH : Denies anxiety, hallucinations or insomnia  HEME/ LYMPH : Denied bleeding , bruising or enlarged nodes  ENDOCRINE: No change in weight, polydipsia, or abnormal bleeding  .        Physical Exam  Head and neck examinations were  unremarkable. There was no temporal wasting. No jaundice noted. No thyromegaly.  No carotid bruits.  There is no peripheral lymphadenopathy.  Lungs were clear to  "auscultation. There when no rales, rhonchi or wheezes.  Cardiac examination revealed normal heart sounds. Rhythm was sinus . There were no murmurs.  Abdomen was full and soft. There was no organomegaly. Bowel sounds were normo- active. There was no ascites. The abdomen was nontender.  Rectal examination was not done.  Extremities: No edema or joint swelling.  Neurological examination: Patient was alert, oriented in person place, and time. There when no focal neurological signs. Cranial nerves were grossly intact. No evidence of encephalopathy. There was no asterixis. The  plantar response was flexor.       Last Recorded Vitals  Blood pressure (!) 124/97, pulse 72, temperature 36.4 °C (97.5 °F), temperature source Temporal, resp. rate 16, height 1.549 m (5' 1\"), weight 70.8 kg (156 lb), SpO2 97 %.    Relevant Results        None     Assessment/Plan  Patient is here for evaluation of chronic diarrhea.  She denied presence of blood in stool or fever.  She denied abdominal pain.  We will proceed with colonoscopy as planned.    nAg Del Castillo MD  "

## 2023-11-01 NOTE — ADDENDUM NOTE
Encounter addended by: Scarlett Acosta RN on: 11/1/2023 1:47 PM   Actions taken: Contacts section saved, Flowsheet accepted

## 2023-11-11 LAB
LABORATORY COMMENT REPORT: NORMAL
PATH REPORT.FINAL DX SPEC: NORMAL
PATH REPORT.GROSS SPEC: NORMAL
PATH REPORT.RELEVANT HX SPEC: NORMAL
PATH REPORT.TOTAL CANCER: NORMAL

## 2023-11-20 ENCOUNTER — APPOINTMENT (OUTPATIENT)
Dept: PAIN MEDICINE | Facility: HOSPITAL | Age: 58
End: 2023-11-20
Payer: MEDICAID

## 2023-12-04 ENCOUNTER — HOSPITAL ENCOUNTER (OUTPATIENT)
Dept: RADIOLOGY | Facility: HOSPITAL | Age: 58
Discharge: HOME | End: 2023-12-04
Payer: MEDICAID

## 2023-12-04 DIAGNOSIS — Z12.31 ENCOUNTER FOR SCREENING MAMMOGRAM FOR BREAST CANCER: ICD-10-CM

## 2023-12-04 PROCEDURE — 77063 BREAST TOMOSYNTHESIS BI: CPT | Performed by: RADIOLOGY

## 2023-12-04 PROCEDURE — 77067 SCR MAMMO BI INCL CAD: CPT | Performed by: RADIOLOGY

## 2023-12-04 PROCEDURE — 77063 BREAST TOMOSYNTHESIS BI: CPT

## 2023-12-06 DIAGNOSIS — R92.8 ABNORMAL MAMMOGRAM: Primary | ICD-10-CM

## 2023-12-07 ENCOUNTER — TELEPHONE (OUTPATIENT)
Dept: PRIMARY CARE | Facility: CLINIC | Age: 58
End: 2023-12-07
Payer: MEDICAID

## 2023-12-07 DIAGNOSIS — R92.8 ABNORMAL MAMMOGRAM: Primary | ICD-10-CM

## 2023-12-07 NOTE — TELEPHONE ENCOUNTER
----- Message from Verónica Bradshaw DO sent at 12/6/2023  4:11 PM EST -----  Review of mammogram: radiology would like to get a better view of the L breast and recommends additional testing.   80% of the time the repeat images are normal, and approximately 10% of women get a call back for additional views. The orders have been placed, please schedule at your earliest convenience.

## 2023-12-20 ENCOUNTER — OFFICE VISIT (OUTPATIENT)
Dept: GASTROENTEROLOGY | Facility: CLINIC | Age: 58
End: 2023-12-20
Payer: MEDICAID

## 2023-12-20 ENCOUNTER — DOCUMENTATION (OUTPATIENT)
Dept: PHYSICAL THERAPY | Facility: HOSPITAL | Age: 58
End: 2023-12-20

## 2023-12-20 VITALS
BODY MASS INDEX: 30.02 KG/M2 | HEIGHT: 61 IN | OXYGEN SATURATION: 98 % | WEIGHT: 159 LBS | HEART RATE: 69 BPM | SYSTOLIC BLOOD PRESSURE: 130 MMHG | DIASTOLIC BLOOD PRESSURE: 80 MMHG

## 2023-12-20 DIAGNOSIS — R32 URINARY INCONTINENCE, UNSPECIFIED TYPE: ICD-10-CM

## 2023-12-20 DIAGNOSIS — K58.9 IRRITABLE BOWEL SYNDROME, UNSPECIFIED TYPE: Primary | ICD-10-CM

## 2023-12-20 DIAGNOSIS — R31.9 HEMATURIA, UNSPECIFIED TYPE: ICD-10-CM

## 2023-12-20 PROCEDURE — 99214 OFFICE O/P EST MOD 30 MIN: CPT | Performed by: PHYSICIAN ASSISTANT

## 2023-12-20 NOTE — ASSESSMENT & PLAN NOTE
Discussed with patient that likely ongoing loose stool and bloating is related to IBS. She states that she was diagnosed with this in the past. Suspect pelvic floor weakness as well. Colonoscopy with 5 polyps (tubular adenomas). She states that her father did have colon cancer. Recommended repeat colonoscopy in 3 years. I recommended daily fiber supplement and if no improvement imodium. Will refer to urogynecology. Stressedn completing the work up with the stool studies for calprotectin and pancreatic elastase.

## 2023-12-20 NOTE — ASSESSMENT & PLAN NOTE
Patient with long standing urinary incontinence and prior procedures with ongoing symptoms. Referral to urogynecology referral placed again, placed at initial appointment but not done.

## 2023-12-20 NOTE — PROGRESS NOTES
Physical Therapy    Discharge Summary    Name: Mary Delgadillo  MRN: [id number]  : 1965  Date: 23    Discharge Summary: PT    Discharge Information: Date of discharge 23, Date of last visit 10-12-23, Date of evaluation 23, Number of attended visits 6, Referred by LIDIA Acevedo, and Referred for chronic LBP    Discharge Status: pt last attended therapy visit 10-    Rehab Discharge Reason: Other plan of care .

## 2023-12-20 NOTE — PROGRESS NOTES
"Subjective   Patient ID: Mary Delgadillo is a 58 y.o. female who presents for Follow-up (Colon 10/31/23, having soft / loose stools, incontinence /Dad had colon cancer ).    Patient's PCP is Dr. Bradshaw    PMH: HLD, depression, fibromyalgia, and history of methamphetamine us, Hashimoto's disease    HPI  Patient was initially seen in September 2023. Patient stated that she has had some long standing GI issues for years and has had \"many colonoscopies\" in the past. Her biggest complaint was urgent diarrhea and incontinence. She states that she has 1 loose BM daily. She states that she had a anal sphincter surgery in 2004 along with bladder suspension. Details unknown, but patient states that she had an injury during child birth. She denied nocturnal diarrhea. She does report chronic urinary and stool incontinence. She has not tried anything for diarrhea.     At her initial visit, I recommended chronic diarrhea labs and stool studies. CRP and celiac disease serology negative. Stool studies not done. Hepatitis C antibody was negative. I did recommend referral to urogynecology, which was not done. Colonoscopy was done and showed 5 tubular adenomas, which were removed, and hemorrhoids. Random biopsies negative. She states that she feeling about the same. She is having 1 urgent, loose BM a day along with urinary incontinence and fecal incontinence. No N/V, abdominal pain, melena, hematochezia.  Prior GI evaluation:  See above    Review of Systems:  GI: Reports stable loose stool and fecal incontinence.  Urinary: reports urinary incontinence and hematuria. No dysuria.    Medications:  Prior to Admission medications    Medication Sig Start Date End Date Taking? Authorizing Provider   atorvastatin (Lipitor) 20 mg tablet Take 1 tablet (20 mg) by mouth once daily.   Yes Historical Provider, MD   sertraline (Zoloft) 100 mg tablet TAKE ONE TABLET BY MOUTH ONCE A DAY. TAKE WITH 50 MG TABLET 10/26/23  Yes Verónica Bradshaw, DO " "  sertraline (Zoloft) 50 mg tablet TAKE ONE TABLET BY MOUTH ONCE A DAY. TAKE WITH 100 MG TABLET 10/26/23  Yes Verónica Bradshaw, DO   GaviLyte-G 236-22.74-6.74 -5.86 gram solution mix as directed and drink over 4 hours  starting at 4pm 9/21/23 12/20/23  Historical Provider, MD       Allergies:  Azithromycin    Past Medical History:  She has a past medical history of COPD (chronic obstructive pulmonary disease) (CMS/[facility]), Poison ivy (05/03/2023), and Schizoaffective disorder, bipolar type (CMS/[facility]) (08/04/2023).    Past Surgical History:  She has a past surgical history that includes Other surgical history (11/21/2022) and Bladder surgery.    Social History:  She reports that she has been smoking cigarettes. She has been smoking an average of .5 packs per day. She has never used smokeless tobacco. She reports that she does not currently use drugs. She reports that she does not drink alcohol.    Objective   Physical exam:  Constitutional: Well developed, well nourished 58 y.o. year old in no acute distress.   Skin: Warm and dry. Normal turgor. No rash, ulcer, trauma, jaundice.   Eyes: Pupils symmetric and reactive to light.  Respiratory: Clear to auscultation bilaterally. No wheezes, rhonchi, or rales heard.  Cardiovascular: Regular rate and rhythm. S1 and S2 appreciated and normal. No murmur, rub, or gallop heard.   Edema: No edema noted.  GI: Normal bowel sounds. Soft, non-distended, non-tender. No rebound or guarding present. No hepatomegaly or splenomegaly appreciated. Abdominal aorta not palpably abnormal.  Musculoskeletal: Limbs and Joints grossly normal. Full range of motion in major joint.   Neuro: Alert and oriented x 3. Cranial nerves 2-12 grossly intact.   Psych: Normal mood and affect.        Relevant Results Recent labs reviewed in the EMR.  Lab Results   Component Value Date    HGB 13.9 12/08/2022    MCV 87 12/08/2022     12/08/2022       No results found for: \"FERRITIN\", \"IRON\"    Lab Results " "  Component Value Date     12/08/2022    K 4.0 12/08/2022     (H) 12/08/2022    BUN 13 12/08/2022    CREATININE 0.97 12/08/2022       Lab Results   Component Value Date    BILITOT 0.4 12/08/2022     Lab Results   Component Value Date    ALT 7 12/08/2022    AST 11 12/08/2022    ALKPHOS 75 12/08/2022       Lab Results   Component Value Date    CRP <0.10 09/21/2023       No results found for: \"CALPS\"    Radiology: Reviewed imaging reviewed in the EMR.      Assessment/Plan   Problem List Items Addressed This Visit             ICD-10-CM    IBS (irritable bowel syndrome) - Primary K58.9     Discussed with patient that likely ongoing loose stool and bloating is related to IBS. She states that she was diagnosed with this in the past. Suspect pelvic floor weakness as well. Colonoscopy with 5 polyps (tubular adenomas). She states that her father did have colon cancer. Recommended repeat colonoscopy in 3 years. I recommended daily fiber supplement and if no improvement imodium. Will refer to urogynecology. Stressedn completing the work up with the stool studies for calprotectin and pancreatic elastase.          Urinary incontinence R32     Patient with long standing urinary incontinence and prior procedures with ongoing symptoms. Referral to urogynecology referral placed again, placed at initial appointment but not done.         Hematuria R31.9     No pain with urinary or signs of UTI at this time. Referral to urology placed.              Sherice Mckeon PA-C    "

## 2024-01-25 ENCOUNTER — TELEPHONE (OUTPATIENT)
Dept: PRIMARY CARE | Facility: CLINIC | Age: 59
End: 2024-01-25
Payer: MEDICAID

## 2024-01-25 DIAGNOSIS — E78.2 MIXED HYPERLIPIDEMIA: Primary | ICD-10-CM

## 2024-01-25 NOTE — TELEPHONE ENCOUNTER
Rx Refill Request Telephone Encounter    Name:  Mary OAKLEY Elie  :  [id number]  Medication Name:  Atorvastatin  20 mg          Specific Pharmacy location:  [facility]

## 2024-01-26 RX ORDER — ATORVASTATIN CALCIUM 20 MG/1
20 TABLET, FILM COATED ORAL DAILY
Qty: 90 TABLET | Refills: 3 | Status: SHIPPED | OUTPATIENT
Start: 2024-01-26

## 2024-01-30 ENCOUNTER — LAB (OUTPATIENT)
Dept: LAB | Facility: LAB | Age: 59
End: 2024-01-30
Payer: MEDICAID

## 2024-01-30 ENCOUNTER — ANCILLARY ORDERS (OUTPATIENT)
Dept: PRIMARY CARE | Facility: CLINIC | Age: 59
End: 2024-01-30

## 2024-01-30 ENCOUNTER — HOSPITAL ENCOUNTER (OUTPATIENT)
Dept: RADIOLOGY | Facility: HOSPITAL | Age: 59
Discharge: HOME | End: 2024-01-30
Payer: MEDICAID

## 2024-01-30 DIAGNOSIS — R92.8 ABNORMAL MAMMOGRAM: Primary | ICD-10-CM

## 2024-01-30 DIAGNOSIS — R92.8 ABNORMAL MAMMOGRAM: ICD-10-CM

## 2024-01-30 DIAGNOSIS — K58.9 IRRITABLE BOWEL SYNDROME, UNSPECIFIED TYPE: ICD-10-CM

## 2024-01-30 PROCEDURE — 77061 BREAST TOMOSYNTHESIS UNI: CPT | Mod: LEFT SIDE | Performed by: RADIOLOGY

## 2024-01-30 PROCEDURE — 76642 ULTRASOUND BREAST LIMITED: CPT | Mod: LT

## 2024-01-30 PROCEDURE — 76982 USE 1ST TARGET LESION: CPT | Mod: LT

## 2024-01-30 PROCEDURE — 77061 BREAST TOMOSYNTHESIS UNI: CPT | Mod: LT

## 2024-01-30 PROCEDURE — 77065 DX MAMMO INCL CAD UNI: CPT | Mod: LEFT SIDE | Performed by: RADIOLOGY

## 2024-01-30 PROCEDURE — 76642 ULTRASOUND BREAST LIMITED: CPT | Mod: LEFT SIDE | Performed by: RADIOLOGY

## 2024-01-30 PROCEDURE — 82653 EL-1 FECAL QUANTITATIVE: CPT

## 2024-01-30 PROCEDURE — 83993 ASSAY FOR CALPROTECTIN FECAL: CPT

## 2024-01-31 ENCOUNTER — TELEPHONE (OUTPATIENT)
Dept: PRIMARY CARE | Facility: CLINIC | Age: 59
End: 2024-01-31
Payer: MEDICAID

## 2024-01-31 DIAGNOSIS — Z71.1 CONCERN ABOUT SKIN CANCER WITHOUT DIAGNOSIS: Primary | ICD-10-CM

## 2024-01-31 NOTE — TELEPHONE ENCOUNTER
Her imaging was most consistent with a cyst which is a benign entity. The radiologist cannot say 100% without a biopsy they can only say what the structure looks like. We follow these for every 6 months to make sure no change that would suggest something that is not benign, but at this time to the best of our ability it is a cyst.     Derm referral placed.

## 2024-01-31 NOTE — TELEPHONE ENCOUNTER
"I spoke with patient she is concerned with the terminology on the results saying \"probably benign\" and would like further clarification. Also asking for a ref for derm for all the masses and bumps on her body.  "

## 2024-01-31 NOTE — TELEPHONE ENCOUNTER
----- Message from Verónica Bradshaw DO sent at 1/31/2024  9:26 AM EST -----  Masses consistent with benign etiology/complex cysts, plan for 6 month diagnostic laura + US. ordered

## 2024-02-01 ENCOUNTER — OFFICE VISIT (OUTPATIENT)
Dept: PAIN MEDICINE | Facility: HOSPITAL | Age: 59
End: 2024-02-01
Payer: MEDICAID

## 2024-02-01 VITALS
SYSTOLIC BLOOD PRESSURE: 133 MMHG | HEART RATE: 78 BPM | BODY MASS INDEX: 29.37 KG/M2 | DIASTOLIC BLOOD PRESSURE: 86 MMHG | RESPIRATION RATE: 16 BRPM | HEIGHT: 62 IN | WEIGHT: 159.6 LBS

## 2024-02-01 DIAGNOSIS — M54.16 LUMBAR NEURITIS: Primary | ICD-10-CM

## 2024-02-01 PROCEDURE — 99214 OFFICE O/P EST MOD 30 MIN: CPT | Performed by: PHYSICAL MEDICINE & REHABILITATION

## 2024-02-01 PROCEDURE — 99204 OFFICE O/P NEW MOD 45 MIN: CPT | Performed by: PHYSICAL MEDICINE & REHABILITATION

## 2024-02-01 ASSESSMENT — ENCOUNTER SYMPTOMS
DEPRESSION: 0
LOSS OF SENSATION IN FEET: 0
OCCASIONAL FEELINGS OF UNSTEADINESS: 0

## 2024-02-01 ASSESSMENT — PATIENT HEALTH QUESTIONNAIRE - PHQ9
1. LITTLE INTEREST OR PLEASURE IN DOING THINGS: NOT AT ALL
2. FEELING DOWN, DEPRESSED OR HOPELESS: NOT AT ALL
SUM OF ALL RESPONSES TO PHQ9 QUESTIONS 1 AND 2: 0

## 2024-02-01 NOTE — PROGRESS NOTES
"Subjective   Patient ID: Mary Delgadillo is a 58 y.o. female with PMH of IBS, schizophrenia (with hallucinations; resolved with invaiga), hx of methamphetamine, fibromyalgia (dx 30 years ago), hashimotos who presents for Back Pain.  HPI  Per patient original injury was in , was helping son move and herniated 4  discs; MRI a the time showed multiple herniated discs; she reports that she was unable to ambulate for 6 months. She physically recovered using methamphetamines; she stopped in  after her   from drug overdose. Of note, she was incarcerated for 13 months in [place] (for assault/trespassing); which she feels exacerbated her back pain (release in 2022). Pain has been progressively getting worse since. Does not feel like her fibromyalgia pain.     Patient presents to office for new radicular low back pain. radiates down right leg to ankle, states that lateral left leg has tingling and numbness but not pain. Pain intereferes with sleep and ADLs, pain is increased with prolonged sitting, and laying down. Better with walking. Reports increased sensitivity in dorsal aspect of left foot.  She reports bowel accidents (attributed to IBS).    Patient was seen by Dr. Acevedo on 23; \"if any surgical intervention is needed after failure of nonoperative management. If she undergoes a trial of physical therapy and pain management without improvement in her symptoms we have recommended she return to clinic for discussion of potential surgical procedure which would be a L4-5 MIS TLIF. \"    Pain Score: 8/10 at rest    Injections/Procedures: denies injection, PT in past without relief (last completed in October >6 sessions) continues to do exercises.    Neuromodulators/Other Medications: zoloft for fibromyalgia    Other:                OARRS:  No data recorded  I believe that it is clinically appropriate for Mary Delgadillo to be prescribed this medication    Is the patient prescribed a combination of a " benzodiazepine and opioid?  No    Last Urine Drug Screen / ordered today: No  No results found for this or any previous visit (from the past 8760 hour(s)).  N/A    Review of Systems     Current Outpatient Medications   Medication Instructions    atorvastatin (LIPITOR) 20 mg, oral, Daily    sertraline (Zoloft) 100 mg tablet TAKE ONE TABLET BY MOUTH ONCE A DAY. TAKE WITH 50 MG TABLET    sertraline (Zoloft) 50 mg tablet TAKE ONE TABLET BY MOUTH ONCE A DAY. TAKE WITH 100 MG TABLET        Past Medical History:   Diagnosis Date    COPD (chronic obstructive pulmonary disease) (CMS/[facility])     Poison ivy 05/03/2023    Schizoaffective disorder, bipolar type (CMS/[facility]) 08/04/2023        Past Surgical History:   Procedure Laterality Date    BLADDER SURGERY      OTHER SURGICAL HISTORY  11/21/2022    Nose surgery        Family History   Problem Relation Name Age of Onset    Dementia Mother      Prostate cancer Father          Allergies   Allergen Reactions    Azithromycin Unknown        Objective     Vitals:    02/01/24 1307   BP: 133/86   Pulse: 78   Resp: 16        Physical Exam    GENERAL EXAM  Vital Signs: Vital signs to include heart rate, respiration rate, blood pressure, and temperature were reviewed.  General Appearance:  Awake, alert, healthy appearing, well developed, No acute distress.  Head: Normocephalic without evidence of head injury.  Neck: The appearance of the neck was normal without swelling with a midline trachea.  Eyes: The eyelids and eyebrows exhibited no abnormalities.  Pupils were not pin-point.  Sclera was without icterus.  Lungs: Respiration rhythm and depth was normal.  Respiratory movements were normal without labored breathing.  Cardiovascular: No peripheral edema was present.    Neurological: Patient was oriented to time, place, and person.  Speech was normal.  Balance, gait, and stance were unremarkable.    Psychiatric: Appearance was normal with appropriate dress.  Mood was euthymic and affect was  normal.  Skin: Affected regions were without ecchymosis or skin lesions.    Physical exam as above except:  Pain to palpation of cervical and lumbar paraspinals bilaterally  Significant pain with forward flexion limited to 30-40 degrees   Positive seated slump on the right  RACHAEL and FADIR neg b/l     Assessment/Plan   Diagnoses and all orders for this visit:  Lumbar neuritis  -     Epidural Steroid Injection; Future    58 y.o. female with PMH of IBS, schizophrenia (with hallucinations; resolved with invaiga), hx of methamphetamine, fibromyalgia (dx 30 years ago), hashimotos who presents for Back Pain.  Suspected L4 and L5 b/l radiculitis   Fibromyalgia      - Patient has completed Physical therapy with limited benefit  - MRI  of lumbar spine from 08/04/23 interpreted  as:  Evidence of anterolisthesis of L4 on L5 with evidence of central canal stenosis   - Given failure of greater of 6 weeks of conservative therapy and pain that is affecting her ability to ambulate patient will be scheduled for L4-L5 interlaminar epidural steroid injection.   - would likely have better benefit with duloxetine than zoloft (prescribed by PCP) for pain but will defer that to her PCP.  - Could consider the addition of other medications though patient rightfully anxious about anything else given her drug abuse history.          This note was generated with the aid of dictation software, there may be typos despite my attempts at proofreading.     I saw and evaluated the patient. I personally obtained the key and critical portions of the history and physical exam or was physically present for key and critical portions performed by the resident/fellow. I reviewed the resident/fellow's documentation and discussed the patient with the resident/fellow. I agree with the resident/fellow's medical decision making as documented in the note.    Tejas Echols MD

## 2024-02-02 LAB — CALPROTECTIN STL-MCNT: <5 UG/G

## 2024-02-03 LAB — PANCREATIC ELASTASE, FECAL: >800

## 2024-02-16 ENCOUNTER — OFFICE VISIT (OUTPATIENT)
Dept: UROLOGY | Facility: CLINIC | Age: 59
End: 2024-02-16
Payer: MEDICAID

## 2024-02-16 VITALS — DIASTOLIC BLOOD PRESSURE: 82 MMHG | SYSTOLIC BLOOD PRESSURE: 133 MMHG

## 2024-02-16 DIAGNOSIS — R10.2 PELVIC PAIN: Primary | ICD-10-CM

## 2024-02-16 DIAGNOSIS — R32 URINARY INCONTINENCE, UNSPECIFIED TYPE: ICD-10-CM

## 2024-02-16 DIAGNOSIS — R31.9 HEMATURIA, UNSPECIFIED TYPE: ICD-10-CM

## 2024-02-16 PROCEDURE — 51798 US URINE CAPACITY MEASURE: CPT | Performed by: STUDENT IN AN ORGANIZED HEALTH CARE EDUCATION/TRAINING PROGRAM

## 2024-02-16 PROCEDURE — 99205 OFFICE O/P NEW HI 60 MIN: CPT | Performed by: STUDENT IN AN ORGANIZED HEALTH CARE EDUCATION/TRAINING PROGRAM

## 2024-02-16 RX ORDER — DIAZEPAM 5 MG/1
TABLET ORAL
Qty: 20 TABLET | Refills: 2 | Status: SHIPPED | OUTPATIENT
Start: 2024-02-16

## 2024-02-16 NOTE — PROGRESS NOTES
Referred by:  Dr. Bradshaw    PCP  Verónica Bradshaw DO         CHIEF COMPLAINT:           HISTORY OF PRESENT ILLNESS:  This is a  58 y.o. y.o. who presents on 2/16/24 referred by Dr. Bradshaw who believes she has something in pelvic floor. Patient states that onset around 10/23 she had fecal urgency and this only occurred once. She has IBS symptoms. Patient has urinary frequency. She had a sphincterotomy twice in the past for weakened muscles that caused her to have fecal incontinence. She was advised to get a coloscopy bag but never did. She has one child. Not currently taking any fiber. She reports pain upon wiping with bowel movements.  Has h/o prior sling.     Patient is not currently sexually active as her  passed away a few years ago. She never experienced dyspareunia.          Past Medical History  She has a past medical history of COPD (chronic obstructive pulmonary disease) (CMS/[facility]), Poison ivy (05/03/2023), and Schizoaffective disorder, bipolar type (CMS/[facility]) (08/04/2023).    Surgical History  She has a past surgical history that includes Other surgical history (11/21/2022) and Bladder surgery.     Social History  She reports that she has been smoking cigarettes. She has been smoking an average of .5 packs per day. She has never used smokeless tobacco. She reports that she does not currently use drugs. She reports that she does not drink alcohol.    Family History  Family History   Problem Relation Name Age of Onset    Dementia Mother      Prostate cancer Father          Allergies  Azithromycin        A comprehensive 10+ review of systems was negative except for: see hpi                    PHYSICAL EXAMINATION:  BP Readings from Last 3 Encounters:   02/01/24 133/86   12/20/23 130/80   10/31/23 127/66      Wt Readings from Last 3 Encounters:   02/01/24 72.4 kg (159 lb 9.6 oz)   12/20/23 72.1 kg (159 lb)   10/31/23 70.8 kg (156 lb)      BMI: Estimated body mass index is 29.19 kg/m² as calculated from  "the following:    Height as of 2/1/24: 1.575 m (5' 2\").    Weight as of 2/1/24: 72.4 kg (159 lb 9.6 oz).  BSA: Estimated body surface area is 1.78 meters squared as calculated from the following:    Height as of 2/1/24: 1.575 m (5' 2\").    Weight as of 2/1/24: 72.4 kg (159 lb 9.6 oz).  HEENT: Normocephalic, atraumatic, PER EOMI, nonicteric, trachea normal, thyroid normal, oropharynx normal.  CARDIAC: regular rate & rhythm, S1 & S2 normal.  No heaves, thrills, gallops or murmurs.  LUNGS: Clear to auscultation, no spinal or CV tenderness.  EXTREMITIES: No evidence of cyanosis, clubbing or edema.      Pelvic:  Genitourinary:  normal external genitalia, Bartholin's glands negative, [place]'s glands negative  Urethra   mesh erosion noted  Vaginal mucosa  normal  Cervix surgically absent normal  Uterus surgically absent normal size, nontender  Adnexae  negative nontender, no masses  Atrophy negative    CST negative  Pelvic floor muscle contraction  4/5    POP-Q (in supine position):        Aa -3     Ba -3     C -8              gh 3     pb 0.5     tvl 8              Ap -3     Bp -3     D     Rectal: no hemorrhoids, fissures or masses    PVR (by Ultrasound):  16        IMPRESSION AND PLAN:  Mary Delgadillo is a 58 y.o. who presents with mesh erosion from prior sling, pelvic floor muscle hypertonicity, fecal incontinence    CPP:  Hypertonic pelvic floor  Start PT  Given valium to use  Discussed pelvic floor botox    FI:  Start metamucil and PT   Discussed SNM and anal bulking    Mesh erosion:   Not presently bothersome, will monitor       Follow up in 3 months      All questions and concerns were answered and addressed.  The patient expressed understanding and agrees with the plan.     2/16/2024     By signing my name below, IBo Scribe   attest that this documentation has been prepared under the direction and in the presence of Dr. Sarkis Boggs.     "

## 2024-02-16 NOTE — LETTER
February 19, 2024     Sherice Mckeon PA-C  [address]  [place] [facility] [address]    Patient: Mary Delgadillo   YOB: 1965   Date of Visit: 2/16/2024       Dear Dr. Sherice Mckeon PA-C:    Thank you for referring Mary Delgadillo to me for evaluation. Below are my notes for this consultation.  If you have questions, please do not hesitate to call me. I look forward to following your patient along with you.       Sincerely,     Sarkis Boggs MD      CC: No Recipients  ______________________________________________________________________________________    Referred by:  Dr. Bradshaw    PCP  Verónica Bradshaw DO         CHIEF COMPLAINT:           HISTORY OF PRESENT ILLNESS:  This is a  58 y.o. y.o. who presents on 2/16/24 referred by Dr. Bradshaw who believes she has something in pelvic floor. Patient states that onset around 10/23 she had fecal urgency and this only occurred once. She has IBS symptoms. Patient has urinary frequency. She had a sphincterotomy twice in the past for weakened muscles that caused her to have fecal incontinence. She was advised to get a coloscopy bag but never did. She has one child. Not currently taking any fiber. She reports pain upon wiping with bowel movements.  Has h/o prior sling.     Patient is not currently sexually active as her  passed away a few years ago. She never experienced dyspareunia.          Past Medical History  She has a past medical history of COPD (chronic obstructive pulmonary disease) (CMS/[facility]), Poison ivy (05/03/2023), and Schizoaffective disorder, bipolar type (CMS/[facility]) (08/04/2023).    Surgical History  She has a past surgical history that includes Other surgical history (11/21/2022) and Bladder surgery.     Social History  She reports that she has been smoking cigarettes. She has been smoking an average of .5 packs per day. She has never used smokeless tobacco. She reports that she does not currently  "use drugs. She reports that she does not drink alcohol.    Family History  Family History   Problem Relation Name Age of Onset   • Dementia Mother     • Prostate cancer Father          Allergies  Azithromycin        A comprehensive 10+ review of systems was negative except for: see hpi                    PHYSICAL EXAMINATION:  BP Readings from Last 3 Encounters:   02/01/24 133/86   12/20/23 130/80   10/31/23 127/66      Wt Readings from Last 3 Encounters:   02/01/24 72.4 kg (159 lb 9.6 oz)   12/20/23 72.1 kg (159 lb)   10/31/23 70.8 kg (156 lb)      BMI: Estimated body mass index is 29.19 kg/m² as calculated from the following:    Height as of 2/1/24: 1.575 m (5' 2\").    Weight as of 2/1/24: 72.4 kg (159 lb 9.6 oz).  BSA: Estimated body surface area is 1.78 meters squared as calculated from the following:    Height as of 2/1/24: 1.575 m (5' 2\").    Weight as of 2/1/24: 72.4 kg (159 lb 9.6 oz).  HEENT: Normocephalic, atraumatic, PER EOMI, nonicteric, trachea normal, thyroid normal, oropharynx normal.  CARDIAC: regular rate & rhythm, S1 & S2 normal.  No heaves, thrills, gallops or murmurs.  LUNGS: Clear to auscultation, no spinal or CV tenderness.  EXTREMITIES: No evidence of cyanosis, clubbing or edema.      Pelvic:  Genitourinary:  normal external genitalia, Bartholin's glands negative, [place]'s glands negative  Urethra   mesh erosion noted  Vaginal mucosa  normal  Cervix surgically absent normal  Uterus surgically absent normal size, nontender  Adnexae  negative nontender, no masses  Atrophy negative    CST negative  Pelvic floor muscle contraction  4/5    POP-Q (in supine position):        Aa -3     Ba -3     C -8              gh 3     pb 0.5     tvl 8              Ap -3     Bp -3     D     Rectal: no hemorrhoids, fissures or masses    PVR (by Ultrasound):  16        IMPRESSION AND PLAN:  Mary Delgadillo is a 58 y.o. who presents with mesh erosion from prior sling, pelvic floor muscle hypertonicity, fecal " incontinence    CPP:  Hypertonic pelvic floor  Start PT  Given valium to use  Discussed pelvic floor botox    FI:  Start metamucil and PT   Discussed SNM and anal bulking    Mesh erosion:   Not presently bothersome, will monitor       Follow up in 3 months      All questions and concerns were answered and addressed.  The patient expressed understanding and agrees with the plan.     2/16/2024     By signing my name below, IBo Scribe   attest that this documentation has been prepared under the direction and in the presence of Dr. Sarkis Boggs.

## 2024-03-01 ENCOUNTER — TELEPHONE (OUTPATIENT)
Dept: PRIMARY CARE | Facility: CLINIC | Age: 59
End: 2024-03-01
Payer: MEDICAID

## 2024-03-01 NOTE — TELEPHONE ENCOUNTER
Med Refill   sertraline (Zoloft) 50 mg tablet [[id number]]     [id number] - [place], [place] - [address] [address]  Phone: [phone]  Fax: [phone]

## 2024-03-08 ENCOUNTER — HOSPITAL ENCOUNTER (OUTPATIENT)
Dept: GASTROENTEROLOGY | Facility: HOSPITAL | Age: 59
Discharge: HOME | End: 2024-03-08
Payer: MEDICAID

## 2024-03-08 ENCOUNTER — HOSPITAL ENCOUNTER (OUTPATIENT)
Dept: RADIOLOGY | Facility: HOSPITAL | Age: 59
Discharge: HOME | End: 2024-03-08
Payer: MEDICAID

## 2024-03-08 VITALS
RESPIRATION RATE: 18 BRPM | TEMPERATURE: 98.4 F | HEART RATE: 60 BPM | SYSTOLIC BLOOD PRESSURE: 163 MMHG | WEIGHT: 158 LBS | DIASTOLIC BLOOD PRESSURE: 92 MMHG | HEIGHT: 62 IN | OXYGEN SATURATION: 99 % | BODY MASS INDEX: 29.08 KG/M2

## 2024-03-08 DIAGNOSIS — M54.16 LUMBAR NEURITIS: ICD-10-CM

## 2024-03-08 DIAGNOSIS — R52 PAIN: ICD-10-CM

## 2024-03-08 PROCEDURE — 2500000005 HC RX 250 GENERAL PHARMACY W/O HCPCS: Performed by: PHYSICAL MEDICINE & REHABILITATION

## 2024-03-08 PROCEDURE — 62323 NJX INTERLAMINAR LMBR/SAC: CPT | Performed by: PHYSICAL MEDICINE & REHABILITATION

## 2024-03-08 PROCEDURE — 2500000004 HC RX 250 GENERAL PHARMACY W/ HCPCS (ALT 636 FOR OP/ED): Performed by: PHYSICAL MEDICINE & REHABILITATION

## 2024-03-08 PROCEDURE — 2550000001 HC RX 255 CONTRASTS: Performed by: PHYSICAL MEDICINE & REHABILITATION

## 2024-03-08 RX ORDER — LIDOCAINE HYDROCHLORIDE 5 MG/ML
INJECTION, SOLUTION INFILTRATION; INTRAVENOUS AS NEEDED
Status: COMPLETED | OUTPATIENT
Start: 2024-03-08 | End: 2024-03-08

## 2024-03-08 RX ORDER — METHYLPREDNISOLONE ACETATE 40 MG/ML
INJECTION, SUSPENSION INTRA-ARTICULAR; INTRALESIONAL; INTRAMUSCULAR; SOFT TISSUE AS NEEDED
Status: COMPLETED | OUTPATIENT
Start: 2024-03-08 | End: 2024-03-08

## 2024-03-08 RX ADMIN — LIDOCAINE HYDROCHLORIDE 15 ML: 5 INJECTION, SOLUTION INFILTRATION at 09:24

## 2024-03-08 RX ADMIN — IOHEXOL 5 ML: 350 INJECTION, SOLUTION INTRAVENOUS at 09:27

## 2024-03-08 RX ADMIN — METHYLPREDNISOLONE ACETATE 40 MG: 40 INJECTION, SUSPENSION INTRA-ARTICULAR; INTRALESIONAL; INTRAMUSCULAR; SOFT TISSUE at 09:27

## 2024-03-08 ASSESSMENT — COLUMBIA-SUICIDE SEVERITY RATING SCALE - C-SSRS
1. IN THE PAST MONTH, HAVE YOU WISHED YOU WERE DEAD OR WISHED YOU COULD GO TO SLEEP AND NOT WAKE UP?: NO
6. HAVE YOU EVER DONE ANYTHING, STARTED TO DO ANYTHING, OR PREPARED TO DO ANYTHING TO END YOUR LIFE?: NO
2. HAVE YOU ACTUALLY HAD ANY THOUGHTS OF KILLING YOURSELF?: NO

## 2024-03-08 ASSESSMENT — PAIN - FUNCTIONAL ASSESSMENT: PAIN_FUNCTIONAL_ASSESSMENT: 0-10

## 2024-03-08 ASSESSMENT — PAIN SCALES - GENERAL
PAINLEVEL_OUTOF10: 7
PAINLEVEL_OUTOF10: 10 - WORST POSSIBLE PAIN

## 2024-03-08 NOTE — PROCEDURES
General    Date/Time: 3/8/2024 9:29 AM    Performed by: Tejas Echols MD  Authorized by: Tejas Echols MD    Consent:     Consent obtained:  Written    Consent given by:  Patient    Risks, benefits, and alternatives were discussed: yes      Risks discussed:  Bleeding, infection, pain and nerve damage    Alternatives discussed:  No treatment, delayed treatment and alternative treatment  Universal protocol:     Procedure explained and questions answered to patient or proxy's satisfaction: yes      Relevant documents present and verified: yes      Test results available: yes      Imaging studies available: yes      Required blood products, implants, devices, and special equipment available: yes      Site/side marked: yes      Immediately prior to procedure, a time out was called: yes      Patient identity confirmed:  Verbally with patient, hospital-assigned identification number and arm band  Procedure specific details:      Lumbar interlaminar REUBEN     After informed consent was obtained, the patient was brought to the operating room and placed in the prone position.  The back area was prepped and draped in usual sterile fashion.  Using fluoroscopic guidance, the skin and subcutaneous tissue overlying needle trajectory to the below interlaminar epidural space were anesthetized with a total of 5 mL of 0.5% lidocaine in the below paraspinous approach.  An 18-gauge Tuohy needle was then advanced under fluoroscopic guidance with the below paraspinous approach into the below  interlaminar epidural space. Entry of the epidural space was confirmed using loss of resistance technique with a glass syringe and 2 mL of air.  Injection of Iohexol contrast revealed appropriate spread without vascular uptake. Subsequently, the below medications were injected.  The needle was removed.  The patient tolerated the procedure well.  The patient was then transferred to recovery room in stable condition. The patient will participate in  physical therapy, update us on his/her response, follow up with us on outpatient basis as needed.    Level: [L4-5]  Medications [4 mL of 0.5% lidocaine and 40 mg methylprednisolone]

## 2024-03-08 NOTE — H&P
Patient ID: Patient with lumbar neuritis who presents for the scheduled procedure.  No changes since last visit      Patient denies any recent antibiotic use or infections, denies any blood thinner use, and denies contrast or local anesthetic allergies           GENERAL EXAM  Vital Signs: Vital signs to include heart rate, respiration rate, blood pressure, and temperature were reviewed.  General Appearance:  Awake, alert, healthy appearing, well developed, No acute distress.  Head: Normocephalic without evidence of head injury.  Neck: The appearance of the neck was normal without swelling with a midline trachea.  Eyes: The eyelids and eyebrows exhibited no abnormalities.  Pupils were not pin-point.  Sclera was without icterus.  Lungs: Respiration rhythm and depth was normal.  Respiratory movements were normal without labored breathing.  Cardiovascular: No peripheral edema was present.    Neurological: Patient was oriented to time, place, and person.  Speech was normal.  Balance, gait, and stance were unremarkable.    Psychiatric: Appearance was normal with appropriate dress.  Mood was euthymic and affect was normal.  Skin: Affected regions were without ecchymosis or skin lesions.        Physical exam as above except:        Assessment/Plan    Patient with lumbar neuritis here for L4-5 interlaminar REUBEN

## 2024-03-18 ENCOUNTER — TELEPHONE (OUTPATIENT)
Dept: PRIMARY CARE | Facility: CLINIC | Age: 59
End: 2024-03-18
Payer: MEDICAID

## 2024-03-18 DIAGNOSIS — M79.7 FIBROMYALGIA: ICD-10-CM

## 2024-03-18 RX ORDER — SERTRALINE HYDROCHLORIDE 100 MG/1
TABLET, FILM COATED ORAL
Qty: 90 TABLET | Refills: 1 | Status: SHIPPED | OUTPATIENT
Start: 2024-03-18

## 2024-03-25 ENCOUNTER — EVALUATION (OUTPATIENT)
Dept: PHYSICAL THERAPY | Facility: HOSPITAL | Age: 59
End: 2024-03-25
Payer: MEDICAID

## 2024-03-25 DIAGNOSIS — N39.46 MIXED STRESS AND URGE URINARY INCONTINENCE: ICD-10-CM

## 2024-03-25 DIAGNOSIS — R10.2 PELVIC PAIN: Primary | ICD-10-CM

## 2024-03-25 DIAGNOSIS — M62.81 MUSCLE WEAKNESS: ICD-10-CM

## 2024-03-25 PROCEDURE — 97163 PT EVAL HIGH COMPLEX 45 MIN: CPT | Mod: GP | Performed by: PHYSICAL THERAPIST

## 2024-03-25 PROCEDURE — 97535 SELF CARE MNGMENT TRAINING: CPT | Mod: GP | Performed by: PHYSICAL THERAPIST

## 2024-03-25 PROCEDURE — 97110 THERAPEUTIC EXERCISES: CPT | Mod: GP | Performed by: PHYSICAL THERAPIST

## 2024-03-25 ASSESSMENT — ENCOUNTER SYMPTOMS
OCCASIONAL FEELINGS OF UNSTEADINESS: 0
LOSS OF SENSATION IN FEET: 1
DEPRESSION: 0

## 2024-03-25 ASSESSMENT — PATIENT HEALTH QUESTIONNAIRE - PHQ9
SUM OF ALL RESPONSES TO PHQ9 QUESTIONS 1 AND 2: 0
2. FEELING DOWN, DEPRESSED OR HOPELESS: NOT AT ALL
1. LITTLE INTEREST OR PLEASURE IN DOING THINGS: NOT AT ALL

## 2024-03-25 ASSESSMENT — PAIN - FUNCTIONAL ASSESSMENT: PAIN_FUNCTIONAL_ASSESSMENT: 0-10

## 2024-03-25 ASSESSMENT — PAIN DESCRIPTION - DESCRIPTORS: DESCRIPTORS: DULL;ACHING

## 2024-03-25 ASSESSMENT — PAIN SCALES - GENERAL: PAINLEVEL_OUTOF10: 4

## 2024-03-25 NOTE — PROGRESS NOTES
"Physical Therapy    PELVIC FLOOR EVALUATION AND TREATMENT    Name: Mary Delgadillo  MRN: [id number]  : 1965  Today's Date: 3/25/2024     Time Calculation  Start Time: 1609  Stop Time: 1710  Time Calculation (min): 61 min    Assessment:  PT Assessment Results: Decreased strength, Decreased range of motion, Decreased endurance, Decreased coordination, Pain  Rehab Prognosis: Good   Pt presents with symptoms of mixed stress and urge urinary incontinence, urinary frequency, pelvic pain and dysfunction, pelvic floor weakness and muscle insufficiency and decreased overall endurance affecting her functional activity performance at [facility].    Recommend skilled physical therapy to address the above deficits that affect functional activities of daily living.   Skilled intervention is needed to train and provide proper technique, educate patient on progression, risks, prognosis, and provide adequate feedback for technique correction and implementation.     Discharge planned upon achievement of goals or reaching maximum benefit from skilled physical therapy services.    Plan:   Treatment/Interventions: Biofeedback, Education/ Instruction, Electrical stimulation, Manual therapy, Neuromuscular re-education, Self care/ home management, Therapeutic exercises  PT Plan: Skilled PT  PT Frequency: 1 time per week  Rehab Potential: Good  Plan of Care Agreement: Patient    Interventions: Issued and educated with handouts: the \"Knack\", Bladder irritants and Overcoming the urge    Current Problem:  1. Pelvic pain  Referral to Physical Therapy    Follow Up In Physical Therapy      2. Muscle weakness  Follow Up In Physical Therapy      3. Mixed stress and urge urinary incontinence            Subjective   General  Reason for Referral: Urinary urgency, frequency and vaginal pain  Referred By: Dr. Boggs  Past Medical History Relevant to Rehab: Hx cervical CA x2, bowel incontinence, hx sphincterotomy; IBS  Precautions  STEADI Fall Risk Score " (The score of 4 or more indicates an increased risk of falling): 3  Precautions Comment: None noted     Pain  Pain Assessment: 0-10  Pain Score: 4  Pain Type: Chronic pain  Pain Location: Vagina  Pain Orientation: Left  Pain Descriptors: Dull, Aching  Pain Frequency: Constant/continuous    Objective   PELVIC HISTORY:  Chief Complaint/Description of Symptoms: Pt reports the problems began when she had a BM in her sleep in August 2023 and she was also having back issues with disc bulges where she was having L LE numbness and R LE tingling radicular symptoms. She had lumbar steroid injections 2 weeks ago. She  HPI: Hx cervical CA x2; poor childbirth with sig episiotomy, sphincterotomy and bladder sling in 2004 and sphincterotomy done again ~8 months later, but both failed. She reports also vaginal canal pain began in Fall 2023 as well and it is worse with prolonged sitting. Dr Boggs said he could see her bladder mesh through her vaginal canal wall.  Home Environment/Social Factors/Occupation: Retired    PELVIC PAIN:  Pelvic pain since onset: worse  Location: Vaginal canal  Current pain level: 3-4/10  Worst pain level: 5/10  What makes pain better: Standing, reclining  What makes pain worse: Prolonged sitting  Pain when emptying bladder: No  Pain when urine or clothing touches the skin over the vaginal area or wiping: Yes  Pain with menses: Other: (comment) (N/A)  Pain with intercourse: No (N/A pt is a  and not sexually active)  Description: Dull aching  Duration: Intermittent  Frequency: Constant  History of back pain: Yes, just had lumbar steroid injections 2 weeks more on R     EXERCISE:  Do you do Kegels? No   Current exercise regime: No    BLADDER:  Excessive urinary urgency: always  Daytime voiding frequency : >/=15  Nighttime voiding frequency: 4+ times  Unintentional urine loss: almost daily  Leakage occurs with: cough, laugh, sneeze, urge, lifting, changing position  Leakage amount: small,  moderate  Protection: liners, Other: (comment) (Pt changes her underwear 4x/day)  Daily fluid intake: 0-12oz  Daily caffeine intake: 84oz diet Coke  Difficulty initiating urine flow: frequently  Slow/Weak urine stream: frequently  Able to void completely: No  Frequent UTI's: No  Diagnostic tests: PVR 16    BOWEL:  Unintentional stool loss: less than 1x/week  Bowel movement frequency: once a day  Frequent diarrhea: No  Constipation/straining,incomplete bowel movement: No (Pt unable to empty fully)  Taking stool softeners or fiber supplements: No  Excessive Bowel Urgency: No    *Internal exam deferred to next visit per pt's request*    EXTERNAL PALPATION:  Transverse perineum: (-) Pain/Tightness Jeanmarie    QL: (+) Pain/Tightness   Glutes: (-) Pain/Tightness   Abdominals: (-) Pain/Tightness     POSTURE: Reduced lumbar lordosis      MMT R/L:  Hip flex: 4/5 Jeanmarie  Hip abd: 4-,4/5 Jeanmarie  Hip IR: 4-/5 Jeanmarie*  Hip ER: 4-,4/5 Jeanmarie*  TA: 3+/5 with pain    ROM R/L:  Lumbar flex: Impaired  Lumbar ext: Impaired  Hip flex: WFL painful lumbar spine  Hip IR: WFL but painful lumbar spine  Hip ER: Decreased Jeanmarie with pain in hips    FLEXIBILITY R/L:  Hamstrings: ~55 deg jeanmarie  Psoas: moderate tightness    OUTCOMES MEASURE:  Female NIH-CPSI: 20 (Pain 7, Urinary 6, QOL 7)    Education:  Outpatient Education  Individual(s) Educated: Patient  Education Provided: POC, Home Exercise Program  Risk and Benefits Discussed with Patient/Caregiver/Other: yes  Patient/Caregiver Demonstrated Understanding: yes  Plan of Care Discussed and Agreed Upon: yes  Patient Response to Education: Patient/Caregiver Verbalized Understanding of Information    Careplan Goals:  Crista Galicia Problems (Active)       Problem: PT Problem       Goal: PT Goal 2          Goal: PT Goal 3          Goal: PT Goal 4            Pelvic pain PT Problems (Active)       Problem: Pelvic pain and urinary frequency       Goal: 1) Pt to report 50% reduction in incontinence episodes to indicate  increased bladder control          Goal: 2) Pt to be Indep with a core & PF stabilization and bladder control HEP with 75% teach back capability by the patient          Goal: 3) Pt to tolerate medical examinations and intercourse without increased pelvic/vaginal pain          Goal: 4) Functional Outcome NIH CPSI score improves by >50% raw score to indicate improvement in subscales          Goal: 5) Pt to decrease underwear changes to <2/day to improve pt's QOL              Liudmila Joseph, PT

## 2024-04-02 ENCOUNTER — OFFICE VISIT (OUTPATIENT)
Dept: PAIN MEDICINE | Facility: HOSPITAL | Age: 59
End: 2024-04-02
Payer: MEDICAID

## 2024-04-02 VITALS
OXYGEN SATURATION: 96 % | DIASTOLIC BLOOD PRESSURE: 81 MMHG | WEIGHT: 160 LBS | HEIGHT: 62 IN | HEART RATE: 78 BPM | RESPIRATION RATE: 16 BRPM | BODY MASS INDEX: 29.44 KG/M2 | SYSTOLIC BLOOD PRESSURE: 134 MMHG

## 2024-04-02 DIAGNOSIS — R10.2 PELVIC PAIN: Primary | ICD-10-CM

## 2024-04-02 DIAGNOSIS — M79.7 FIBROMYALGIA: Chronic | ICD-10-CM

## 2024-04-02 DIAGNOSIS — M54.16 LUMBAR NEURITIS: ICD-10-CM

## 2024-04-02 PROCEDURE — 99214 OFFICE O/P EST MOD 30 MIN: CPT | Performed by: CLINICAL NURSE SPECIALIST

## 2024-04-02 ASSESSMENT — ENCOUNTER SYMPTOMS
OCCASIONAL FEELINGS OF UNSTEADINESS: 0
LOSS OF SENSATION IN FEET: 0
DEPRESSION: 0

## 2024-04-02 ASSESSMENT — COLUMBIA-SUICIDE SEVERITY RATING SCALE - C-SSRS
2. HAVE YOU ACTUALLY HAD ANY THOUGHTS OF KILLING YOURSELF?: NO
6. HAVE YOU EVER DONE ANYTHING, STARTED TO DO ANYTHING, OR PREPARED TO DO ANYTHING TO END YOUR LIFE?: NO
1. IN THE PAST MONTH, HAVE YOU WISHED YOU WERE DEAD OR WISHED YOU COULD GO TO SLEEP AND NOT WAKE UP?: NO

## 2024-04-02 NOTE — ASSESSMENT & PLAN NOTE
58 y.o. female with PMH of IBS, schizophrenia (with hallucinations; resolved with invaiga), hx of methamphetamine, fibromyalgia, hashimotos who presents for follow-up of lumbar radicular pain.  Patient was sent for interlaminar lumbar epidural steroid injection at L4-5 targeting bilateral lumbar radiculitis.  She also has a component of fibromyalgia type pain currently affecting cervical region/trapezius muscles.  She previously completed physical therapy with limited benefit.  We discussed the benefit of restarting physical therapy exercises targeting myofascial/fibromyalgia pain.  We also discussed that continued exercise is the best treatment for fibromyalgia.  Currently she is receiving moderate relief from her recent lumbar epidural steroid injection.  She has noted a significant decrease in radicular symptoms in bilateral lower extremities after recent epidural steroid injection.  She states it is easier to go from seated to standing position and to get in and out of bed since her injection.  She also has noted less cramping and spasms in her lower legs.  She is experiencing myofascial pain and tenderness to the paracervical region as well as persistent pelvic pain and pressure.  She is following with urology for pelvic pain and is continuing pelvic floor therapy.  Reviewed with patient that she may repeat her lumbar epidural steroid injection approximately every 3 months if needed.  She does feel that she continues to get relief from this injection.  She would like to repeat this injection, however, she would like to follow-up with urology to discuss further treatment recommendations for pelvic pain/pressure before repeating this injection.  Patient would like to avoid additional oral medication secondary to fear of addiction and previous history of drug abuse.  She currently is maintained on Zoloft.  She may benefit from a rotation to duloxetine which she can discuss with her PCP.  At this time we will add  NSAID compounding cream for topical pain relief which may benefit lumbar pain as well as myofascial pain related to fibromyalgia.  She may continue Tylenol and instructed to limit her total Tylenol dose to less than 3000 mg in 24 hours.  She may also benefit from the addition of lidocaine patches.  Plan reviewed with patient at today's visit.    -Advised patient that she may repeat her interlaminar epidural steroid injection at L4-5 every 3 months as needed.  She will call if she would like to reschedule this procedure.  -Added NSAID compounding cream  -She may continue supplementing with Tylenol.  She may also add lidocaine patches for topical pain relief.  -She may follow-up with our office as needed.  She may call if she would like to repeat her injection.    Disclaimer: This note was transcribed using an audio transcription device.  As such, minor errors may be present with regard to spelling, punctuation, and inadvertent word insertion.  Please disregard such errors.

## 2024-04-02 NOTE — PROGRESS NOTES
Subjective   Patient ID: Mary Delgadillo is a 58 y.o. female who presents for back pain  HPI    58-year-old female with medical history pertinent for IBS, schizophrenia with hallucinations resolved with Invaiga, striae of methamphetamine use, fibromyalgia, Hashimoto's presents for follow-up of low back pain.  Patient originally injured her back in  when she was helping her disabled son move and herniated several discs.  She reports that her MRI at that time indicated multiple herniated disks and that she was unable to ambulate for approximately 6 months.  At that point she was using methamphetamine which she stated helped her get out of bed and move.  She stopped using methamphetamine in  after her   from a drug overdose.  Incarcerated for 13 months in [place] which she felt exacerbated her back pain.  She states that her pain progressively worsened.  She also has total body fibromyalgia pain which moves to multiple areas.  She presented at her last office visit with lumbar radicular symptoms which are interfering with her ability to sleep.  MRI 2023 consistent with anterolisthesis of L4 on 5 with evidence of central canal stenosis.  She had numbness and tingling in her lower extremities most noted and bothersome at bedtime.  She was evaluated by an orthospine surgeon on 2023 indicating she should try a physical therapy and pain management prior to considering surgical intervention.  If she does not get relief with conservative measures she could proceed with L4-5 MIS TLIF.  Completed physical therapy in the past with little relief.  Last formal physical therapy in the fall with limited relief.  She does continue to do home exercises and stretches.  She is hesitant to add any medications secondary to history of abuse.  Presents at today's office visit for follow-up after interlaminar lumbar epidural steroid injection at L4-5.  She states she received moderate relief with this injection.   She does feel that the injection helped the pain radiating to bilateral legs.  She states that she has little to no radicular symptoms at this time.  She states she has an easier time getting in and out of bed secondary to decreased lower extremity pain.  She has noted improvement in cramping in her lower legs.  She continues to have back pain and pelvic pain.  Pain is aching, throbbing and burning.  She has noted an increase in myofascial pain most bothersome between her shoulder blades which she relates to her fibromyalgia.  Pain increased with standing or attempting to walk a long distance.  She feels her pelvic pain is related to recently diagnosed vaginal hernia and mesh erosion from previous bladder sling.  She also has had multiple surgeries in the past including sphincterotomy and bladder surgery.  Urology recommending pelvic floor therapy, Valium and potential Botox therapy.  She has started pelvic floor therapy and will continue until she follows up with urology.  She has added Tylenol which she states has been helpful for pain.  She does think the Valium suppositories help and uses mostly at bedtime.      Pt is here for low back pain more left sided that radiates into bilateral hips and has right calf pain. Pain in between upper shoulder area.    Pain Score: 4/10     Injections/Procedures:3/8/24 Interlaminar L4/L5 20% relief/ PT in past without relief (last completed in October >6 sessions) continues to do exercises.    Neuromodulators/Other Medications: zoloft for fibromyalgia    Other:denies       OARRS:  Crista Clark, APRN-CNP, APRN-CNS on 4/2/2024  2:07 PM  I have personally reviewed the OARRS report for Mary Delgadillo. I have considered the risks of abuse, dependence, addiction and diversion    Is the patient prescribed a combination of a benzodiazepine and opioid?  No    Last Urine Drug Screen / ordered today: No  No results found for this or any previous visit (from the past 0462  hour(s)).  N/A    Controlled Substance Agreement:  Date of the Last Agreement:       Monitoring and compliance:    ORT:    PDUQ:    Office Agreement:      Review of Systems    ROS:   General: No fevers, chills, weight loss  Skin: Negative for lesions  Eyes: No acute vision changes  Ears: No vertigo  Nose, mouth, throat: No difficulty swallowing or speaking  Respiratory: No cough, shortness of breath, cyanosis  Cardiovascular: Negative for chest pain syncope or palpitation  Gastrointestinal: No constipation, nausea, vomiting  Neurological: Negative for headache,   Psychological: Negative for severe or debilitating anxiety, depression. Negative memory loss  Musculoskeletal: Positive for arthralgia, myalgia and pain  Endocrine: Negative for weight gain, appetite changes, excessive sweating  Allergy/immune: Negative    All 13 systems were reviewed and are within normal levels except as noted or in the history of present illness.  Positive or pertinent negative responses are noted or were in the history of present illness. As noted, the patient denies significant or impairing weakness in the bilateral upper and lower extremities, medication induced constipation, and bowel or bladder incontinence.     Current Outpatient Medications:     atorvastatin (Lipitor) 20 mg tablet, Take 1 tablet (20 mg) by mouth once daily., Disp: 90 tablet, Rfl: 3    diazePAM (Valium) 5 mg tablet, Place 1 pill in vagina q8hrs prn for pelvic pain, Disp: 20 tablet, Rfl: 2    sertraline (Zoloft) 100 mg tablet, TAKE ONE TABLET BY MOUTH ONCE A DAY. TAKE WITH 50 MG TABLET, Disp: 90 tablet, Rfl: 1    sertraline (Zoloft) 50 mg tablet, TAKE ONE TABLET BY MOUTH ONCE A DAY. TAKE WITH 100 MG TABLET, Disp: 90 tablet, Rfl: 1     Past Medical History:   Diagnosis Date    COPD (chronic obstructive pulmonary disease) (CMS/Formerly [facility])     Poison ivy 05/03/2023    Schizoaffective disorder, bipolar type (CMS/Formerly [facility]) 08/04/2023        Past Surgical History:   Procedure  "Laterality Date    BLADDER SURGERY      OTHER SURGICAL HISTORY  11/21/2022    Nose surgery        Family History   Problem Relation Name Age of Onset    Dementia Mother      Prostate cancer Father          Allergies   Allergen Reactions    Azithromycin Unknown        Objective     Visit Vitals  /81   Pulse 78   Resp 16   Ht 1.562 m (5' 1.5\")   Wt 72.6 kg (160 lb)   SpO2 96%   BMI 29.74 kg/m²   OB Status Menopausal   Smoking Status Every Day   BSA 1.77 m²        Physical Exam    PE:  General: Well-developed, well-nourished, no acute distress. The patient demonstrates no pain behavior, symptom magnification or overt drug-seeking behavior.  Eye: Pupils appropriate for room lighting  Neck/thyroid: No obvious goiter or enlargement of neck noted  Respiratory exam: Normal respiratory effort, unlabored respiration. No accessory muscle use noted  Cardiac exam: Bilateral radial pulses intact  Abdominal: Nondistended  Spine, cervical: Tenderness to paraspinous musculature paracervical region.  Multiple myofascial tender points and muscle tightness upper trapezius muscles bilaterally.  Flexion and extension intact with extension increasing pain.  Spine, lumbar: The patient is able to rise from a seated to standing position without hesitancy, push off, or delay. Gait is grossly nonantalgic. Tenderness to paraspinous musculature is noted lower lumbar spine.  Flexion and extension intact with pain increasing with flexion.  Negative straight leg raise.  Neurologic exam: Muscle strength is antigravity in all 4 extremities.  Psychiatric exam: Judgment and insight normal, affect normal, speech is fluent, affect appropriate, demonstrating no signs of hypersomnolence, sedation, or confusion            Assessment/Plan   Problem List Items Addressed This Visit             ICD-10-CM    Fibromyalgia (Chronic) M79.7    Lumbar neuritis M54.16     58 y.o. female with PMH of IBS, schizophrenia (with hallucinations; resolved with invaiga), " hx of methamphetamine, fibromyalgia, hashimotos who presents for follow-up of lumbar radicular pain.  Patient was sent for interlaminar lumbar epidural steroid injection at L4-5 targeting bilateral lumbar radiculitis.  She also has a component of fibromyalgia type pain currently affecting cervical region/trapezius muscles.  She previously completed physical therapy with limited benefit.  We discussed the benefit of restarting physical therapy exercises targeting myofascial/fibromyalgia pain.  We also discussed that continued exercise is the best treatment for fibromyalgia.  Currently she is receiving moderate relief from her recent lumbar epidural steroid injection.  She has noted a significant decrease in radicular symptoms in bilateral lower extremities after recent epidural steroid injection.  She states it is easier to go from seated to standing position and to get in and out of bed since her injection.  She also has noted less cramping and spasms in her lower legs.  She is experiencing myofascial pain and tenderness to the paracervical region as well as persistent pelvic pain and pressure.  She is following with urology for pelvic pain and is continuing pelvic floor therapy.  Reviewed with patient that she may repeat her lumbar epidural steroid injection approximately every 3 months if needed.  She does feel that she continues to get relief from this injection.  She would like to repeat this injection, however, she would like to follow-up with urology to discuss further treatment recommendations for pelvic pain/pressure before repeating this injection.  Patient would like to avoid additional oral medication secondary to fear of addiction and previous history of drug abuse.  She currently is maintained on Zoloft.  She may benefit from a rotation to duloxetine which she can discuss with her PCP.  At this time we will add NSAID compounding cream for topical pain relief which may benefit lumbar pain as well as  myofascial pain related to fibromyalgia.  She may continue Tylenol and instructed to limit her total Tylenol dose to less than 3000 mg in 24 hours.  She may also benefit from the addition of lidocaine patches.  Plan reviewed with patient at today's visit.    -Advised patient that she may repeat her interlaminar epidural steroid injection at L4-5 every 3 months as needed.  She will call if she would like to reschedule this procedure.  -Added NSAID compounding cream  -She may continue supplementing with Tylenol.  She may also add lidocaine patches for topical pain relief.  -She may follow-up with our office as needed.  She may call if she would like to repeat her injection.    Disclaimer: This note was transcribed using an audio transcription device.  As such, minor errors may be present with regard to spelling, punctuation, and inadvertent word insertion.  Please disregard such errors.             Pelvic pain - Primary R10.2

## 2024-04-09 ENCOUNTER — TREATMENT (OUTPATIENT)
Dept: PHYSICAL THERAPY | Facility: HOSPITAL | Age: 59
End: 2024-04-09
Payer: MEDICAID

## 2024-04-09 DIAGNOSIS — N39.46 MIXED STRESS AND URGE URINARY INCONTINENCE: Primary | ICD-10-CM

## 2024-04-09 DIAGNOSIS — M62.81 MUSCLE WEAKNESS: ICD-10-CM

## 2024-04-09 DIAGNOSIS — R10.2 PELVIC PAIN: ICD-10-CM

## 2024-04-09 PROCEDURE — 97140 MANUAL THERAPY 1/> REGIONS: CPT | Mod: GP | Performed by: PHYSICAL THERAPIST

## 2024-04-09 PROCEDURE — 97110 THERAPEUTIC EXERCISES: CPT | Mod: GP | Performed by: PHYSICAL THERAPIST

## 2024-04-09 PROCEDURE — 97535 SELF CARE MNGMENT TRAINING: CPT | Mod: GP | Performed by: PHYSICAL THERAPIST

## 2024-04-09 ASSESSMENT — PAIN SCALES - GENERAL: PAINLEVEL_OUTOF10: 4

## 2024-04-09 ASSESSMENT — PAIN DESCRIPTION - DESCRIPTORS: DESCRIPTORS: SORE

## 2024-04-09 ASSESSMENT — PAIN - FUNCTIONAL ASSESSMENT: PAIN_FUNCTIONAL_ASSESSMENT: 0-10

## 2024-04-09 NOTE — PROGRESS NOTES
"Physical Therapy    PELVIC FLOOR EVALUATION AND TREATMENT    Name: Mary Delgadillo  MRN: [id number]  : 1965  Today's Date: 2024       Time Calculation  Start Time: 1300  Stop Time: 1400  Time Calculation (min): 60 min    Assessment:   Pt tolerated internal PF examination well, but had increased soreness on L side and cramping afterwards. Narrow vaginal canal noted along with short perineum between vaginal opening and rectum showing surgical changes. Encouraged pt to try the vaginal valium after exam to decrease the subsequent cramping. Issued pt HEP for hip flexibility exercises to decrease soreness with positioning.    Plan:    Monitor HEP and educate pt in clamshells and reverse clamshells for glute strengthening.    Interventions: Reviewed bladder techniques and issued HEP    Current Problem:  1. Mixed stress and urge urinary incontinence        2. Pelvic pain  Follow Up In Physical Therapy      3. Muscle weakness  Follow Up In Physical Therapy          Subjective   General   Pt presents w/reports of being more sore last night and today due to having a tough BM last night where she was sitting on the toilet for a prolonged period of time. Had her follow-up with pain management last week for the injections, which she estimates an approx 20% improvement, so they recommended her to get another one at the end of May. Pt states she was able to use the \"quick flicks\" PF contraction technique yesterday to reduce the urinary urgency triggered while washing dishes.  Precautions  STEADI Fall Risk Score (The score of 4 or more indicates an increased risk of falling): 3  Precautions Comment: none noted     Pain  Pain Assessment: 0-10  Pain Score: 4  Pain Type: Chronic pain  Pain Location: Vagina  Pain Descriptors: Sore  Pain Frequency: Constant/continuous    Objective   External and internal assessment explained. Verbal consent obtained to proceed with vaginal exam.    EXTERNAL OBSERVATION:  Voluntary Contraction: " (+)   Voluntary Relaxation: Paradoxical  Involuntary Contraction: (+)  Involuntary Relaxation: Incomplete        INTERNAL VAGINAL EXAMINATION:  PFM Strength: 4-/5  Coordination: 9 in 10 seconds   Endurance: 3 second holds x 8 repetitions    INTERNAL PALPATION:   (+) pain and tightness at L>R LA and OI, sharp edges felt at 1 o'clock and 11 o'clock which is likely mesh sling erosion protruding through her anterior vaginal wall. Urethra sore to palpation and increased bulk noted.    EXTERNAL PALPATION:  Levator Ani: (+) Pain/Tightness Jeanmarie  Bulbo: (+) Pain/Tightness L  Ischiocavernosus: (+) Pain/Tightness L  Transverse perineum: (+) Pain/Tightness L after internal exam    Iliopsoas: (+) Pain/Tightness Jeanmarie  Adductor: (+) Tightness Jeanmarie  Abdominals: (-) Tightness     OUTCOMES MEASURE:  Female NIH-CPSI: 20 (Pain 7, Urinary 6, QOL 7)    Education:  HEP issued:   [email or web address].[employer]  Access Code: [email or web address]    Careplan Goals:  Problem: Pelvic pain and urinary frequency       Goal: 1) Pt to report 50% reduction in incontinence episodes to indicate increased bladder control          Goal: 2) Pt to be Indep with a core & PF stabilization and bladder control HEP with 75% teach back capability by the patient          Goal: 3) Pt to tolerate medical examinations and intercourse without increased pelvic/vaginal pain          Goal: 4) Functional Outcome NIH CPSI score improves by >50% raw score to indicate improvement in subscales          Goal: 5) Pt to decrease underwear changes to <2/day to improve pt's QOL           Liudmila Joseph, PT

## 2024-04-22 ENCOUNTER — TREATMENT (OUTPATIENT)
Dept: PHYSICAL THERAPY | Facility: HOSPITAL | Age: 59
End: 2024-04-22
Payer: MEDICAID

## 2024-04-22 DIAGNOSIS — N39.46 MIXED STRESS AND URGE URINARY INCONTINENCE: Primary | ICD-10-CM

## 2024-04-22 DIAGNOSIS — M62.81 MUSCLE WEAKNESS: ICD-10-CM

## 2024-04-22 DIAGNOSIS — R10.2 PELVIC PAIN: ICD-10-CM

## 2024-04-22 PROCEDURE — 97535 SELF CARE MNGMENT TRAINING: CPT | Mod: GP | Performed by: PHYSICAL THERAPIST

## 2024-04-22 ASSESSMENT — PAIN - FUNCTIONAL ASSESSMENT: PAIN_FUNCTIONAL_ASSESSMENT: 0-10

## 2024-04-22 ASSESSMENT — PAIN SCALES - GENERAL: PAINLEVEL_OUTOF10: 5 - MODERATE PAIN

## 2024-04-22 ASSESSMENT — PAIN DESCRIPTION - DESCRIPTORS: DESCRIPTORS: SORE
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
## 2024-04-29 ENCOUNTER — DOCUMENTATION (OUTPATIENT)
Dept: PHYSICAL THERAPY | Facility: HOSPITAL | Age: 59
End: 2024-04-29
Payer: MEDICAID

## 2024-04-29 ENCOUNTER — APPOINTMENT (OUTPATIENT)
Dept: PHYSICAL THERAPY | Facility: HOSPITAL | Age: 59
End: 2024-04-29
Payer: MEDICAID

## 2024-04-29 NOTE — PROGRESS NOTES
Physical Therapy                       Therapy Communication Note    Patient Name: Mary Delgadillo  MRN: [id number]  Today's Date: 4/29/2024     Discipline: Physical Therapy    Missed Time: Cancel    Missed Visit Reason:  Unable to make it.

## 2024-05-01 ENCOUNTER — OFFICE VISIT (OUTPATIENT)
Dept: PRIMARY CARE | Facility: CLINIC | Age: 59
End: 2024-05-01
Payer: MEDICAID

## 2024-05-01 VITALS
BODY MASS INDEX: 30.21 KG/M2 | WEIGHT: 160 LBS | DIASTOLIC BLOOD PRESSURE: 82 MMHG | HEIGHT: 61 IN | SYSTOLIC BLOOD PRESSURE: 139 MMHG | OXYGEN SATURATION: 99 % | HEART RATE: 73 BPM

## 2024-05-01 DIAGNOSIS — F15.11: Chronic | ICD-10-CM

## 2024-05-01 DIAGNOSIS — F33.41 RECURRENT MAJOR DEPRESSIVE DISORDER, IN PARTIAL REMISSION (CMS-HCC): ICD-10-CM

## 2024-05-01 DIAGNOSIS — M79.7 FIBROMYALGIA: ICD-10-CM

## 2024-05-01 DIAGNOSIS — Z00.00 PREVENTATIVE HEALTH CARE: Primary | ICD-10-CM

## 2024-05-01 DIAGNOSIS — Z13.220 SCREENING, LIPID: ICD-10-CM

## 2024-05-01 DIAGNOSIS — R10.2 PELVIC PAIN: ICD-10-CM

## 2024-05-01 DIAGNOSIS — Z13.228 SCREENING FOR ENDOCRINE, METABOLIC AND IMMUNITY DISORDER: ICD-10-CM

## 2024-05-01 DIAGNOSIS — F17.200 CURRENT SMOKER: ICD-10-CM

## 2024-05-01 DIAGNOSIS — Z13.29 SCREENING FOR ENDOCRINE, METABOLIC AND IMMUNITY DISORDER: ICD-10-CM

## 2024-05-01 DIAGNOSIS — E06.3 HASHIMOTO'S DISEASE: Chronic | ICD-10-CM

## 2024-05-01 DIAGNOSIS — Z13.0 SCREENING FOR ENDOCRINE, METABOLIC AND IMMUNITY DISORDER: ICD-10-CM

## 2024-05-01 PROBLEM — F20.9 SCHIZOPHRENIA (MULTI): Status: RESOLVED | Noted: 2023-10-31 | Resolved: 2024-05-01

## 2024-05-01 PROCEDURE — 99396 PREV VISIT EST AGE 40-64: CPT | Performed by: STUDENT IN AN ORGANIZED HEALTH CARE EDUCATION/TRAINING PROGRAM

## 2024-05-01 RX ORDER — BUPROPION HYDROCHLORIDE 150 MG/1
150 TABLET ORAL EVERY MORNING
Qty: 30 TABLET | Refills: 1 | Status: SHIPPED | OUTPATIENT
Start: 2024-05-01 | End: 2024-06-30

## 2024-05-01 ASSESSMENT — ENCOUNTER SYMPTOMS
HEADACHES: 0
CONFUSION: 0
DEPRESSION: 0
LOSS OF SENSATION IN FEET: 0
NERVOUS/ANXIOUS: 1
FATIGUE: 1
FEVER: 0
ARTHRALGIAS: 1
APPETITE CHANGE: 0
DECREASED CONCENTRATION: 0
DYSPHORIC MOOD: 1
SHORTNESS OF BREATH: 0
OCCASIONAL FEELINGS OF UNSTEADINESS: 0

## 2024-05-01 NOTE — ASSESSMENT & PLAN NOTE
Seeing gyn  Vaginal valium  Possible surgical procedure for hx of mesh procedure  Doing pelvic floor PT

## 2024-05-01 NOTE — ASSESSMENT & PLAN NOTE
We are prescribing zoloft 150mg  She is also seeing pain management for injection which do seem to be helping
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
## 2024-05-01 NOTE — ASSESSMENT & PLAN NOTE
Recommendation to complete cervical cancer screening either with our office or with her Gynecologist  Recommendation shingles vaccine completion  Recommendation tdap vaccine completion  Has not completed screening labs, will reorder so they do not   Questions answered

## 2024-05-01 NOTE — ASSESSMENT & PLAN NOTE
Counseled on long term negative health impacts on health if tobacco use continued.  Reviewed options for cessation aid including patches, lozenges, Wellbutrin, Chantix.   Successfully quit for 11 years with wellbutrin  Discussed motivational factors to improve chances for success.  Time Spent: 5  Orders: wellbutrin

## 2024-05-06 ENCOUNTER — APPOINTMENT (OUTPATIENT)
Dept: PHYSICAL THERAPY | Facility: HOSPITAL | Age: 59
End: 2024-05-06
Payer: MEDICAID

## 2024-05-06 ENCOUNTER — DOCUMENTATION (OUTPATIENT)
Dept: PHYSICAL THERAPY | Facility: HOSPITAL | Age: 59
End: 2024-05-06
Payer: MEDICAID

## 2024-05-06 NOTE — PROGRESS NOTES
Physical Therapy                      Therapy Communication Note    Patient Name: Mary Delgadillo  MRN: [id number]  Today's Date: 5/6/2024     Discipline: Physical Therapy    Missed Time: Cancel    Missed Visit Reason:  No reason given.

## 2024-05-13 ENCOUNTER — APPOINTMENT (OUTPATIENT)
Dept: PHYSICAL THERAPY | Facility: HOSPITAL | Age: 59
End: 2024-05-13
Payer: MEDICAID

## 2024-05-13 ENCOUNTER — DOCUMENTATION (OUTPATIENT)
Dept: PHYSICAL THERAPY | Facility: HOSPITAL | Age: 59
End: 2024-05-13
Payer: MEDICAID

## 2024-05-13 NOTE — PROGRESS NOTES
Physical Therapy                      Therapy Communication Note    Patient Name: Mary Delgadillo  MRN: [id number]  Today's Date: 5/13/2024     Discipline: Physical Therapy    Missed Time: Cancel    Missed Visit Reason:  Pt cancelled due to being ill.

## 2024-05-20 ENCOUNTER — DOCUMENTATION (OUTPATIENT)
Dept: PHYSICAL THERAPY | Facility: HOSPITAL | Age: 59
End: 2024-05-20
Payer: MEDICAID

## 2024-05-20 NOTE — PROGRESS NOTES
Physical Therapy                      Therapy Communication Note    Patient Name: Mary Delgadillo  MRN: [id number]  Today's Date: 5/20/2024     Discipline: Physical Therapy    Missed Time: No Show    Missed Visit Reason:  Pt no showed for last scheduled visit today. PT called to check pt's status and left voicemail for her to call back to either reschedule further PT visits or request discharge.

## 2024-07-08 ENCOUNTER — APPOINTMENT (OUTPATIENT)
Dept: PRIMARY CARE | Facility: CLINIC | Age: 59
End: 2024-07-08
Payer: MEDICAID

## 2024-07-08 ENCOUNTER — LAB (OUTPATIENT)
Dept: LAB | Facility: LAB | Age: 59
End: 2024-07-08
Payer: MEDICAID

## 2024-07-08 ENCOUNTER — TELEPHONE (OUTPATIENT)
Dept: PRIMARY CARE | Facility: CLINIC | Age: 59
End: 2024-07-08

## 2024-07-08 VITALS
BODY MASS INDEX: 29.44 KG/M2 | SYSTOLIC BLOOD PRESSURE: 130 MMHG | HEART RATE: 76 BPM | OXYGEN SATURATION: 97 % | DIASTOLIC BLOOD PRESSURE: 72 MMHG | HEIGHT: 62 IN | WEIGHT: 160 LBS

## 2024-07-08 DIAGNOSIS — Z13.29 SCREENING FOR ENDOCRINE, METABOLIC AND IMMUNITY DISORDER: ICD-10-CM

## 2024-07-08 DIAGNOSIS — F17.200 CURRENT SMOKER: ICD-10-CM

## 2024-07-08 DIAGNOSIS — Z13.228 SCREENING FOR ENDOCRINE, METABOLIC AND IMMUNITY DISORDER: ICD-10-CM

## 2024-07-08 DIAGNOSIS — Z13.220 SCREENING, LIPID: ICD-10-CM

## 2024-07-08 DIAGNOSIS — E06.3 HASHIMOTO'S DISEASE: ICD-10-CM

## 2024-07-08 DIAGNOSIS — F33.41 RECURRENT MAJOR DEPRESSIVE DISORDER, IN PARTIAL REMISSION (CMS-HCC): ICD-10-CM

## 2024-07-08 DIAGNOSIS — Z13.0 SCREENING FOR ENDOCRINE, METABOLIC AND IMMUNITY DISORDER: ICD-10-CM

## 2024-07-08 DIAGNOSIS — E78.2 MIXED HYPERLIPIDEMIA: ICD-10-CM

## 2024-07-08 LAB
ALBUMIN SERPL BCP-MCNC: 3.9 G/DL (ref 3.4–5)
ALP SERPL-CCNC: 84 U/L (ref 33–110)
ALT SERPL W P-5'-P-CCNC: 9 U/L (ref 7–45)
ANION GAP SERPL CALC-SCNC: 12 MMOL/L (ref 10–20)
AST SERPL W P-5'-P-CCNC: 13 U/L (ref 9–39)
BILIRUB SERPL-MCNC: 0.3 MG/DL (ref 0–1.2)
BUN SERPL-MCNC: 14 MG/DL (ref 6–23)
CALCIUM SERPL-MCNC: 9 MG/DL (ref 8.6–10.3)
CHLORIDE SERPL-SCNC: 110 MMOL/L (ref 98–107)
CHOLEST SERPL-MCNC: 172 MG/DL (ref 0–199)
CHOLESTEROL/HDL RATIO: 3.3
CO2 SERPL-SCNC: 23 MMOL/L (ref 21–32)
CREAT SERPL-MCNC: 0.8 MG/DL (ref 0.5–1.05)
EGFRCR SERPLBLD CKD-EPI 2021: 86 ML/MIN/1.73M*2
EST. AVERAGE GLUCOSE BLD GHB EST-MCNC: 103 MG/DL
GLUCOSE SERPL-MCNC: 83 MG/DL (ref 74–99)
HBA1C MFR BLD: 5.2 %
HDLC SERPL-MCNC: 51.5 MG/DL
LDLC SERPL CALC-MCNC: 100 MG/DL
NON HDL CHOLESTEROL: 121 MG/DL (ref 0–149)
POTASSIUM SERPL-SCNC: 4.4 MMOL/L (ref 3.5–5.3)
PROT SERPL-MCNC: 6.3 G/DL (ref 6.4–8.2)
SODIUM SERPL-SCNC: 141 MMOL/L (ref 136–145)
TRIGL SERPL-MCNC: 105 MG/DL (ref 0–149)
TSH SERPL-ACNC: 2.71 MIU/L (ref 0.44–3.98)
VIT B12 SERPL-MCNC: 378 PG/ML (ref 211–911)
VLDL: 21 MG/DL (ref 0–40)

## 2024-07-08 PROCEDURE — 36415 COLL VENOUS BLD VENIPUNCTURE: CPT

## 2024-07-08 PROCEDURE — 83036 HEMOGLOBIN GLYCOSYLATED A1C: CPT

## 2024-07-08 PROCEDURE — 82607 VITAMIN B-12: CPT

## 2024-07-08 PROCEDURE — 80053 COMPREHEN METABOLIC PANEL: CPT

## 2024-07-08 PROCEDURE — 99214 OFFICE O/P EST MOD 30 MIN: CPT | Performed by: STUDENT IN AN ORGANIZED HEALTH CARE EDUCATION/TRAINING PROGRAM

## 2024-07-08 PROCEDURE — 80061 LIPID PANEL: CPT

## 2024-07-08 PROCEDURE — 82652 VIT D 1 25-DIHYDROXY: CPT

## 2024-07-08 PROCEDURE — 84443 ASSAY THYROID STIM HORMONE: CPT

## 2024-07-08 ASSESSMENT — ENCOUNTER SYMPTOMS
DYSPHORIC MOOD: 1
FATIGUE: 1
DECREASED CONCENTRATION: 1
NERVOUS/ANXIOUS: 0
CONFUSION: 0
LOSS OF SENSATION IN FEET: 0
DEPRESSION: 1
OCCASIONAL FEELINGS OF UNSTEADINESS: 0
HYPERACTIVE: 0
SHORTNESS OF BREATH: 0
FEVER: 0

## 2024-07-08 ASSESSMENT — PATIENT HEALTH QUESTIONNAIRE - PHQ9
8. MOVING OR SPEAKING SO SLOWLY THAT OTHER PEOPLE COULD HAVE NOTICED. OR THE OPPOSITE, BEING SO FIGETY OR RESTLESS THAT YOU HAVE BEEN MOVING AROUND A LOT MORE THAN USUAL: NOT AT ALL
7. TROUBLE CONCENTRATING ON THINGS, SUCH AS READING THE NEWSPAPER OR WATCHING TELEVISION: NOT AT ALL
10. IF YOU CHECKED OFF ANY PROBLEMS, HOW DIFFICULT HAVE THESE PROBLEMS MADE IT FOR YOU TO DO YOUR WORK, TAKE CARE OF THINGS AT HOME, OR GET ALONG WITH OTHER PEOPLE: SOMEWHAT DIFFICULT
2. FEELING DOWN, DEPRESSED OR HOPELESS: NEARLY EVERY DAY
3. TROUBLE FALLING OR STAYING ASLEEP OR SLEEPING TOO MUCH: NEARLY EVERY DAY
9. THOUGHTS THAT YOU WOULD BE BETTER OFF DEAD, OR OF HURTING YOURSELF: NOT AT ALL
SUM OF ALL RESPONSES TO PHQ9 QUESTIONS 1 AND 2: 6
6. FEELING BAD ABOUT YOURSELF - OR THAT YOU ARE A FAILURE OR HAVE LET YOURSELF OR YOUR FAMILY DOWN: NOT AT ALL
5. POOR APPETITE OR OVEREATING: NOT AT ALL
1. LITTLE INTEREST OR PLEASURE IN DOING THINGS: NEARLY EVERY DAY
SUM OF ALL RESPONSES TO PHQ QUESTIONS 1-9: 12
4. FEELING TIRED OR HAVING LITTLE ENERGY: NEARLY EVERY DAY

## 2024-07-08 ASSESSMENT — ANXIETY QUESTIONNAIRES
6. BECOMING EASILY ANNOYED OR IRRITABLE: NOT AT ALL
GAD7 TOTAL SCORE: 0
5. BEING SO RESTLESS THAT IT IS HARD TO SIT STILL: NOT AT ALL
3. WORRYING TOO MUCH ABOUT DIFFERENT THINGS: NOT AT ALL
1. FEELING NERVOUS, ANXIOUS, OR ON EDGE: NOT AT ALL
7. FEELING AFRAID AS IF SOMETHING AWFUL MIGHT HAPPEN: NOT AT ALL
2. NOT BEING ABLE TO STOP OR CONTROL WORRYING: NOT AT ALL
4. TROUBLE RELAXING: NOT AT ALL

## 2024-07-08 NOTE — ASSESSMENT & PLAN NOTE
Has not been able to get into counseling thus far    If issues again please let me know and we will do collaborative care   Stop wellbutrin given her SE(extreme fatigue)   Continue zoloft   Will run gene site testing     Previously also on invega while she was in psychosis 2/2 methamphetamine abuse, Se of 65lbs weight gain in 5 months

## 2024-07-08 NOTE — PROGRESS NOTES
"Patient Name:  Mary Delgadillo  MRN:  [id number]  :  1965    Subjective   Patient ID: Mary Delgadillo is a 58 y.o. female who presents for Follow-up (Pt here to follow up and go over labs, is taking Wellbutrin and zoloft and sleeping more since the Wellbutrin.).    HPI     57 yo female here for follow up    LV started on Wellbutrin to help with depression and smoking cessation    Feels she is sleeping more since being on the medication      Sleeping in til past noon    Not able to get counseling set up     Brother is on wellbutrin and something else     She also completed labs   Thyroid Stimulating Hormone  0.44 - 3.98 mIU/L 2.71 2.48 C     LDL Calculated  <=99 mg/dL 100 High  173 High       Review of Systems   Constitutional:  Positive for fatigue. Negative for fever.   Respiratory:  Negative for shortness of breath.    Cardiovascular:  Negative for chest pain.   Allergic/Immunologic: Negative for immunocompromised state.   Psychiatric/Behavioral:  Positive for decreased concentration and dysphoric mood. Negative for confusion. The patient is not nervous/anxious and is not hyperactive.      Objective   /72 (BP Location: Left arm, Patient Position: Sitting)   Pulse 76   Ht 1.562 m (5' 1.5\")   Wt 72.6 kg (160 lb)   SpO2 97%   BMI 29.74 kg/m²     Physical Exam  Constitutional:       Appearance: Normal appearance.   HENT:      Head: Normocephalic and atraumatic.   Eyes:      Extraocular Movements: Extraocular movements intact.   Cardiovascular:      Rate and Rhythm: Normal rate and regular rhythm.   Pulmonary:      Effort: Pulmonary effort is normal. No respiratory distress.   Musculoskeletal:      Right lower leg: No edema.      Left lower leg: No edema.   Skin:     Coloration: Skin is not jaundiced or pale.   Neurological:      General: No focal deficit present.      Mental Status: She is alert and oriented to person, place, and time.   Psychiatric:         Mood and Affect: Mood normal.         " Behavior: Behavior normal.         Thought Content: Thought content normal.         Judgment: Judgment normal.     Assessment/Plan   Problem List Items Addressed This Visit             ICD-10-CM    Current smoker F17.200     Smoking less because she is sleeping so much          Recurrent major depressive disorder, in partial remission (CMS-HCC) (Chronic) F33.41     Has not been able to get into counseling thus far    If issues again please let me know and we will do collaborative care   Stop wellbutrin given her SE(extreme fatigue)   Continue zoloft   Will run gene site testing     Previously also on invega while she was in psychosis 2/2 methamphetamine abuse, Se of 65lbs weight gain in 5 months         Relevant Orders    Follow Up In [facility] - PCP - Established

## 2024-07-10 LAB — 1,25(OH)2D3 SERPL-MCNC: 46 PG/ML (ref 19.9–79.3)

## 2024-07-11 ENCOUNTER — TELEPHONE (OUTPATIENT)
Dept: PRIMARY CARE | Facility: CLINIC | Age: 59
End: 2024-07-11
Payer: MEDICAID

## 2024-07-11 DIAGNOSIS — F33.41 RECURRENT MAJOR DEPRESSIVE DISORDER, IN PARTIAL REMISSION (CMS-HCC): Primary | ICD-10-CM

## 2024-07-11 RX ORDER — DESVENLAFAXINE 50 MG/1
50 TABLET, EXTENDED RELEASE ORAL DAILY
Qty: 30 TABLET | Refills: 1 | Status: SHIPPED | OUTPATIENT
Start: 2024-07-11 | End: 2024-09-09

## 2024-07-11 NOTE — TELEPHONE ENCOUNTER
How is she feeling off the wellbutrin has the sleepiness dissipated? I got the results of her gene site testing back, it is indicating a good option for her would be a medication called pristiq. Would she be interested in trying this?

## 2024-07-11 NOTE — TELEPHONE ENCOUNTER
Sleepiness is better, and will try the Prestiq.  [facility] in [place] please.  Looked at her labs and she's wondering if they're indicative of kidney issues?

## 2024-07-11 NOTE — TELEPHONE ENCOUNTER
----- Message from Verónica Bradshaw sent at 7/11/2024  6:50 AM EDT -----  No concerns on labs, happy to see cholesterol levels are improving!

## 2024-07-11 NOTE — TELEPHONE ENCOUNTER
Her kidney function was within normal limits on her labs, I did not have concern for kidney disease.     Medication sent. I would recommend starting it over the weekend to give her body some more time to normalized after the wellbutrin.

## 2024-07-12 ENCOUNTER — TELEPHONE (OUTPATIENT)
Dept: PRIMARY CARE | Facility: CLINIC | Age: 59
End: 2024-07-12
Payer: MEDICAID

## 2024-07-12 NOTE — TELEPHONE ENCOUNTER
Yes the order is still good for her to complete this, she is on a 6 month schedule with her asymmetry on the L.

## 2024-08-01 ENCOUNTER — ANCILLARY ORDERS (OUTPATIENT)
Dept: PRIMARY CARE | Facility: CLINIC | Age: 59
End: 2024-08-01
Payer: MEDICAID

## 2024-08-01 ENCOUNTER — HOSPITAL ENCOUNTER (OUTPATIENT)
Dept: RADIOLOGY | Facility: HOSPITAL | Age: 59
Discharge: HOME | End: 2024-08-01
Payer: MEDICAID

## 2024-08-01 DIAGNOSIS — R92.8 ABNORMAL MAMMOGRAM: ICD-10-CM

## 2024-08-01 DIAGNOSIS — R92.8 ABNORMAL MAMMOGRAM: Primary | ICD-10-CM

## 2024-08-01 PROCEDURE — 77061 BREAST TOMOSYNTHESIS UNI: CPT | Mod: LT

## 2024-08-01 PROCEDURE — 76642 ULTRASOUND BREAST LIMITED: CPT | Mod: LT

## 2024-08-02 ENCOUNTER — TELEPHONE (OUTPATIENT)
Dept: PRIMARY CARE | Facility: CLINIC | Age: 59
End: 2024-08-02
Payer: MEDICAID

## 2024-08-02 NOTE — TELEPHONE ENCOUNTER
----- Message from Verónica Bradshaw sent at 8/2/2024  7:26 AM EDT -----  Review of ultrasound, areas noted are a lymph node and hyperechoic regions. Recommendation is follow up in 6 months to continue to monitor closely.

## 2024-09-09 ENCOUNTER — APPOINTMENT (OUTPATIENT)
Dept: PRIMARY CARE | Facility: CLINIC | Age: 59
End: 2024-09-09
Payer: MEDICAID

## 2024-09-09 VITALS
HEART RATE: 69 BPM | SYSTOLIC BLOOD PRESSURE: 124 MMHG | OXYGEN SATURATION: 96 % | WEIGHT: 159 LBS | HEIGHT: 62 IN | DIASTOLIC BLOOD PRESSURE: 82 MMHG | BODY MASS INDEX: 29.26 KG/M2

## 2024-09-09 DIAGNOSIS — Q30.9 NOSE ABNORMALITY: ICD-10-CM

## 2024-09-09 DIAGNOSIS — Z98.890: ICD-10-CM

## 2024-09-09 DIAGNOSIS — E78.2 MIXED HYPERLIPIDEMIA: ICD-10-CM

## 2024-09-09 DIAGNOSIS — F33.41 RECURRENT MAJOR DEPRESSIVE DISORDER, IN PARTIAL REMISSION (CMS-HCC): Primary | Chronic | ICD-10-CM

## 2024-09-09 DIAGNOSIS — Z13.0 SCREENING FOR ENDOCRINE, METABOLIC AND IMMUNITY DISORDER: ICD-10-CM

## 2024-09-09 DIAGNOSIS — Z13.29 SCREENING FOR ENDOCRINE, METABOLIC AND IMMUNITY DISORDER: ICD-10-CM

## 2024-09-09 DIAGNOSIS — Z13.228 SCREENING FOR ENDOCRINE, METABOLIC AND IMMUNITY DISORDER: ICD-10-CM

## 2024-09-09 PROCEDURE — 99214 OFFICE O/P EST MOD 30 MIN: CPT | Performed by: STUDENT IN AN ORGANIZED HEALTH CARE EDUCATION/TRAINING PROGRAM

## 2024-09-09 PROCEDURE — 3008F BODY MASS INDEX DOCD: CPT | Performed by: STUDENT IN AN ORGANIZED HEALTH CARE EDUCATION/TRAINING PROGRAM

## 2024-09-09 RX ORDER — DESVENLAFAXINE 50 MG/1
50 TABLET, EXTENDED RELEASE ORAL DAILY
Qty: 90 TABLET | Refills: 1 | Status: SHIPPED | OUTPATIENT
Start: 2024-09-09 | End: 2025-03-08

## 2024-09-09 ASSESSMENT — ENCOUNTER SYMPTOMS
DYSPHORIC MOOD: 0
OCCASIONAL FEELINGS OF UNSTEADINESS: 0
SLEEP DISTURBANCE: 0
NERVOUS/ANXIOUS: 0
LOSS OF SENSATION IN FEET: 0
SHORTNESS OF BREATH: 0
FEVER: 0
DEPRESSION: 0
FATIGUE: 0
CONFUSION: 0
DECREASED CONCENTRATION: 0
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
## 2024-09-17 ENCOUNTER — HOSPITAL ENCOUNTER (OUTPATIENT)
Dept: RADIOLOGY | Facility: HOSPITAL | Age: 59
Discharge: HOME | End: 2024-09-17
Payer: MEDICAID

## 2024-09-17 DIAGNOSIS — Z98.890: ICD-10-CM

## 2024-09-17 DIAGNOSIS — Q30.9 NOSE ABNORMALITY: ICD-10-CM

## 2024-09-17 DIAGNOSIS — R52 PAIN: ICD-10-CM

## 2024-09-17 PROCEDURE — 76377 3D RENDER W/INTRP POSTPROCES: CPT | Performed by: RADIOLOGY

## 2024-09-17 PROCEDURE — 70486 CT MAXILLOFACIAL W/O DYE: CPT

## 2024-09-17 PROCEDURE — 76377 3D RENDER W/INTRP POSTPROCES: CPT

## 2024-09-17 PROCEDURE — 70486 CT MAXILLOFACIAL W/O DYE: CPT | Performed by: RADIOLOGY

## 2024-10-21 ENCOUNTER — APPOINTMENT (OUTPATIENT)
Dept: OTOLARYNGOLOGY | Facility: CLINIC | Age: 59
End: 2024-10-21
Payer: MEDICAID

## 2024-10-21 VITALS — WEIGHT: 158 LBS | BODY MASS INDEX: 29.37 KG/M2

## 2024-10-21 DIAGNOSIS — J32.0 CHRONIC MAXILLARY SINUSITIS: ICD-10-CM

## 2024-10-21 DIAGNOSIS — Q30.9 NOSE ABNORMALITY: ICD-10-CM

## 2024-10-21 DIAGNOSIS — Z98.890: ICD-10-CM

## 2024-10-21 PROCEDURE — 99204 OFFICE O/P NEW MOD 45 MIN: CPT | Performed by: GENERAL PRACTICE

## 2024-10-21 RX ORDER — AMOXICILLIN AND CLAVULANATE POTASSIUM 875; 125 MG/1; MG/1
1 TABLET, FILM COATED ORAL 2 TIMES DAILY
Qty: 28 TABLET | Refills: 0 | Status: SHIPPED | OUTPATIENT
Start: 2024-10-21 | End: 2024-11-04

## 2024-10-22 NOTE — PROGRESS NOTES
Otolaryngology - Head and Neck Surgery Outpatient New Patient Visit Note        Assessment/Plan:   Problem List Items Addressed This Visit    None  Visit Diagnoses         Codes    Hx of rhinoplasty     Z98.890    Nose abnormality     Q30.9    Chronic maxillary sinusitis     J32.0    Relevant Medications    amoxicillin-pot clavulanate (Augmentin) 875-125 mg tablet            59yoF with distant prior rhinoplasty after nasal trauma with small subQ right nasal sidewall firm lesion (possible bone fragement or other) which causes discomfort with glasses wear.  Also with chronic congestion and discolored/foul rhinorrhea/PND despite use of saline products and prior flonase.    Recent CT show b/l maxillary>ethmoid sinusitis changes.  Minor residual deviated septum  Discussed course of antibiotics/steroids to eval for response of sinus symptoms.   Consider excision of R nasal sidewall FB, possible turbinoplasty, limited FESS (b/l MMA) depending on symptom response.        Follow up:  -plan for follow up in clinic as needed and in 1-2 months    All of the above findings, impressions, treatment planning and follow up plans were discussed with the patient who indicated understanding.  the patient was instructed to contact or return to clinic sooner if symptoms/signs persist or worsen despite the above management.      Vel Messina MD  Otolaryngology - Head and Neck Surgery            History Of Present Illness  Mary Delgadillo is a 59 y.o. female presenting for a right nasal sidewall subQ nodule which has been present for decades s/p prior rhinoplasty after nasal trauma.   Reports chronic pain due to footplate of glasses resting on the area.  Multiple styles of glasses tried without relief.  Also with some chronic congestion and chronic discolored/foul PND and rhinorrhea.  Limited relief with prior antibiotics and topical steroids.      No other nasal surgery since rhinoplasty >30y ago.  Rare acute sinusitis.  No obvious  allergic rhinitis.            Past Medical History  She has a past medical history of COPD (chronic obstructive pulmonary disease) (Multi), Poison ivy (05/03/2023), and Schizoaffective disorder, bipolar type (Multi) (08/04/2023).    Surgical History  She has a past surgical history that includes Other surgical history (11/21/2022) and Bladder surgery.     Social History  She reports that she has been smoking cigarettes. She has never used smokeless tobacco. She reports that she does not currently use drugs. She reports that she does not drink alcohol.    Family History  Family History   Problem Relation Name Age of Onset    Dementia Mother      Prostate cancer Father          Allergies  Azithromycin    Review of Systems  ROS: Pertinent positives as noted in HPI.    - CONSTITUTIONAL: Does not report weight loss, fever or chills.    - HEENT:   Ear: Does not report tinnitus, vertigo, hearing loss, otalgia, otorrhea  Nose: Does not report  ,  , epistaxis, decreased smell  Throat: Does not report pain, dysphagia, odynophagia  Larynx: Does not report hoarseness,  difficulty breathing, pain with speaking (odynophonia)  Neck: Does not report new masses, pain, swelling  Face: Does not report    ,  , swelling, numbness, weakness     - RESPIRATORY: Does not report SOB or cough.    - CV: Does not report palpitations or chest pain.     - GI: Does not report abdominal pain, nausea, vomiting or diarrhea.    - : Does not report dysuria or urinary frequency.    - MSK: Does not report myalgia or joint pain.    - SKIN: Does not report rash or pruritus.    - NEUROLOGICAL: Does not report headache or syncope.    - PSYCHIATRIC: Does not report recent changes in mood. Does not report anxiety or depression.         Physical Exam:     GENERAL:   Alert & Oriented to person, place and time; Normal affect and appearance. Well developed and well nourished. Conversant & cooperative with examination.     HEAD:   Normocephalic, atraumatic. No  sinus tenderness to palpation. Normal parotid bilaterally. Normal facial strength.     NEUROLOGIC:   Cranial nerves II-XII grossly intact, gait WNL. Normal mood and affect.    EYES:   Extraocular movements intact. Pupils equal, round, reactive to light and accommodation. No nystagmus, no ptosis. no scleral injection.    EAR:   Normal auricle. No discomfort or TTP with manipulation.   Handheld otoscopic exam showed normal external auditory canals bilaterally. No purulence or EAC inflammation. Minimal cerumen.   Right tympanic membrane clear and mobile without evidence of perforation, retraction or middle ear effusion.   Left tympanic membrane clear and mobile without evidence of perforation, retraction or middle ear effusion.     NOSE:   right nasal sidewall with small (5mm) firm nodule overlying nasal bone which is somewhat mobile, nonTTP.  No overlying skin changes.    No external deformity. No external nasal lesions, lacerations, or scars. Nasal tip symmetrical with normal nasal valves.   Nasal cavity with essentially midline septum, edematous  mucosa and turbinates. No lesions, masses, purulence or polyps.     OC/OP:   Mucous membranes moist, no masses, lesions or exudates.   Normal tongue, floor of mouth, teeth, gums, lips. Normal posterior pharyngeal wall.    Normal tonsils without erythema, exudate or obvious calculi     NECK:   No neck masses or thyroid enlargement. Trachea midline. No tenderness to palpation    LYMPHATIC:   No cervical lymphadenopathy.     RESPIRATORY:   Symmetric chest elevation & no retractions. No significant hoarseness. No increased work of breathing.    CV:   No clubbing or cyanosis. No obvious edema    Skin:   No facial rashes, vesicles or lesions.     Extremities:   No gross abnormalities      Clinic Procedure        Information review:  External sources (notes, imaging, lab results) listed below personally reviewed to aid in medical decision making process.  -CT max/face 9/9/*24  -  PCM note Bradshaw 9/9/24  -PCM note Bradshaw 7/8/24

## 2024-12-04 ENCOUNTER — APPOINTMENT (OUTPATIENT)
Dept: OTOLARYNGOLOGY | Facility: CLINIC | Age: 59
End: 2024-12-04
Payer: MEDICAID

## 2025-01-06 DIAGNOSIS — M79.7 FIBROMYALGIA: ICD-10-CM

## 2025-01-06 RX ORDER — SERTRALINE HYDROCHLORIDE 50 MG/1
TABLET, FILM COATED ORAL
Qty: 90 TABLET | Refills: 3 | Status: SHIPPED | OUTPATIENT
Start: 2025-01-06

## 2025-01-07 DIAGNOSIS — M79.7 FIBROMYALGIA: ICD-10-CM

## 2025-01-08 ENCOUNTER — TELEPHONE (OUTPATIENT)
Dept: PRIMARY CARE | Facility: CLINIC | Age: 60
End: 2025-01-08
Payer: MEDICAID

## 2025-01-08 DIAGNOSIS — F17.200 CURRENT SMOKER: Primary | ICD-10-CM

## 2025-01-08 RX ORDER — SERTRALINE HYDROCHLORIDE 100 MG/1
TABLET, FILM COATED ORAL
Qty: 90 TABLET | Refills: 3 | Status: SHIPPED | OUTPATIENT
Start: 2025-01-08

## 2025-01-08 RX ORDER — VARENICLINE TARTRATE 0.5 (11)-1
0.5 KIT ORAL 2 TIMES DAILY
Qty: 53 EACH | Refills: 0 | Status: SHIPPED | OUTPATIENT
Start: 2025-01-08 | End: 2025-04-08

## 2025-01-08 RX ORDER — VARENICLINE TARTRATE 1 MG/1
1 TABLET, FILM COATED ORAL 2 TIMES DAILY
Qty: 60 TABLET | Refills: 1 | Status: SHIPPED | OUTPATIENT
Start: 2025-02-08 | End: 2025-04-09

## 2025-01-08 NOTE — TELEPHONE ENCOUNTER
Patient wanting to stop smoking and inquiring about prescription for Chantix. [facility]

## 2025-01-08 NOTE — TELEPHONE ENCOUNTER
Wonderful to hear she is ready to take this step!  Chantix works by binding to the receptor, interrupting the reinforcing effects of nicotine that lead to nicotine dependence. Most common SE of this medication are- nausea and disordered sleep(vivid or strange dreams). Rarely this can cause mood changes, if these occur stop medication and alert me ASAP.

## 2025-01-09 ENCOUNTER — TELEPHONE (OUTPATIENT)
Dept: PRIMARY CARE | Facility: CLINIC | Age: 60
End: 2025-01-09
Payer: MEDICAID

## 2025-01-09 NOTE — TELEPHONE ENCOUNTER
Pharmacy is questioning directions on prescription for Chantix starter pack  Dosage usually increases during 1st  week ?

## 2025-01-15 ENCOUNTER — APPOINTMENT (OUTPATIENT)
Dept: OTOLARYNGOLOGY | Facility: CLINIC | Age: 60
End: 2025-01-15
Payer: MEDICAID

## 2025-02-13 ENCOUNTER — APPOINTMENT (OUTPATIENT)
Dept: PRIMARY CARE | Facility: CLINIC | Age: 60
End: 2025-02-13
Payer: MEDICAID

## 2025-02-25 ENCOUNTER — APPOINTMENT (OUTPATIENT)
Dept: RADIOLOGY | Facility: HOSPITAL | Age: 60
End: 2025-02-25
Payer: MEDICAID

## 2025-02-27 ENCOUNTER — HOSPITAL ENCOUNTER (OUTPATIENT)
Dept: RADIOLOGY | Facility: HOSPITAL | Age: 60
Discharge: HOME | End: 2025-02-27
Payer: MEDICAID

## 2025-02-27 ENCOUNTER — TELEPHONE (OUTPATIENT)
Dept: PRIMARY CARE | Facility: CLINIC | Age: 60
End: 2025-02-27
Payer: MEDICAID

## 2025-02-27 VITALS — HEIGHT: 61 IN | WEIGHT: 158 LBS | BODY MASS INDEX: 29.83 KG/M2

## 2025-02-27 DIAGNOSIS — R92.8 ABNORMAL MAMMOGRAM OF LEFT BREAST: Primary | ICD-10-CM

## 2025-02-27 DIAGNOSIS — R92.8 ABNORMAL MAMMOGRAM: ICD-10-CM

## 2025-02-27 PROCEDURE — 76642 ULTRASOUND BREAST LIMITED: CPT | Mod: LT

## 2025-02-27 PROCEDURE — 77062 BREAST TOMOSYNTHESIS BI: CPT

## 2025-02-27 PROCEDURE — 76982 USE 1ST TARGET LESION: CPT | Mod: LT

## 2025-02-27 NOTE — TELEPHONE ENCOUNTER
----- Message from Ludwig Lou sent at 2/27/2025  4:08 PM EST -----  Her left breast ultrasound shows probable benign cyst.  We will repeat for surveillance in 6 months.  ----- Message -----  From: Sandra Shane MA  Sent: 2/27/2025   4:04 PM EST  To: SHEILA Weeks      ----- Message -----  From: Interface, Radiology Results In  Sent: 2/27/2025   3:55 PM EST  To: Verónica Bradshaw DO

## 2025-04-26 DIAGNOSIS — F33.41 RECURRENT MAJOR DEPRESSIVE DISORDER, IN PARTIAL REMISSION (CMS-HCC): Chronic | ICD-10-CM

## 2025-04-26 DIAGNOSIS — F17.200 CURRENT SMOKER: ICD-10-CM

## 2025-04-26 DIAGNOSIS — E78.2 MIXED HYPERLIPIDEMIA: ICD-10-CM

## 2025-04-28 RX ORDER — ATORVASTATIN CALCIUM 20 MG/1
20 TABLET, FILM COATED ORAL DAILY
Qty: 90 TABLET | Refills: 0 | Status: SHIPPED | OUTPATIENT
Start: 2025-04-28

## 2025-04-28 RX ORDER — DESVENLAFAXINE 50 MG/1
50 TABLET, FILM COATED, EXTENDED RELEASE ORAL DAILY
Qty: 90 TABLET | Refills: 0 | Status: SHIPPED | OUTPATIENT
Start: 2025-04-28 | End: 2025-04-30 | Stop reason: SDUPTHER

## 2025-04-28 RX ORDER — VARENICLINE TARTRATE 1 MG/1
TABLET, FILM COATED ORAL
Qty: 60 TABLET | Refills: 5 | Status: SHIPPED | OUTPATIENT
Start: 2025-04-28

## 2025-04-30 DIAGNOSIS — F33.41 RECURRENT MAJOR DEPRESSIVE DISORDER, IN PARTIAL REMISSION (CMS-HCC): Chronic | ICD-10-CM

## 2025-04-30 RX ORDER — DESVENLAFAXINE 50 MG/1
50 TABLET, EXTENDED RELEASE ORAL DAILY
Qty: 90 TABLET | Refills: 0 | Status: SHIPPED | OUTPATIENT
Start: 2025-04-30

## 2025-04-30 NOTE — TELEPHONE ENCOUNTER
Requested Prescriptions     Pending Prescriptions Disp Refills    desvenlafaxine 50 mg 24 hr tablet 90 tablet 0     Sig: Take 1 tablet (50 mg) by mouth once daily. DO NOT CRUSH CHEW OR SPLIT     Lov 9/9/24    Nov

## 2025-08-28 ENCOUNTER — HOSPITAL ENCOUNTER (OUTPATIENT)
Dept: RADIOLOGY | Facility: HOSPITAL | Age: 60
Discharge: HOME | End: 2025-08-28
Payer: MEDICAID

## 2025-08-28 DIAGNOSIS — R92.8 ABNORMAL MAMMOGRAM OF LEFT BREAST: ICD-10-CM

## 2025-08-28 PROCEDURE — 76642 ULTRASOUND BREAST LIMITED: CPT | Mod: LT

## 2025-08-28 PROCEDURE — 76982 USE 1ST TARGET LESION: CPT

## 2025-11-07 ENCOUNTER — APPOINTMENT (OUTPATIENT)
Dept: DERMATOLOGY | Facility: CLINIC | Age: 60
End: 2025-11-07
Payer: MEDICAID